# Patient Record
Sex: FEMALE | Race: WHITE | Employment: UNEMPLOYED | URBAN - METROPOLITAN AREA
[De-identification: names, ages, dates, MRNs, and addresses within clinical notes are randomized per-mention and may not be internally consistent; named-entity substitution may affect disease eponyms.]

---

## 2021-11-01 LAB — PAP SMEAR, EXTERNAL: NORMAL

## 2021-11-10 ENCOUNTER — OFFICE VISIT (OUTPATIENT)
Dept: FAMILY MEDICINE CLINIC | Age: 31
End: 2021-11-10
Payer: COMMERCIAL

## 2021-11-10 VITALS
BODY MASS INDEX: 44.79 KG/M2 | RESPIRATION RATE: 20 BRPM | DIASTOLIC BLOOD PRESSURE: 70 MMHG | HEART RATE: 92 BPM | HEIGHT: 65 IN | OXYGEN SATURATION: 98 % | TEMPERATURE: 98 F | WEIGHT: 268.8 LBS | SYSTOLIC BLOOD PRESSURE: 110 MMHG

## 2021-11-10 DIAGNOSIS — F41.9 ANXIETY: Primary | ICD-10-CM

## 2021-11-10 DIAGNOSIS — R07.9 CHEST PAIN, UNSPECIFIED TYPE: ICD-10-CM

## 2021-11-10 PROCEDURE — 99203 OFFICE O/P NEW LOW 30 MIN: CPT | Performed by: FAMILY MEDICINE

## 2021-11-10 PROCEDURE — 93000 ELECTROCARDIOGRAM COMPLETE: CPT | Performed by: FAMILY MEDICINE

## 2021-11-10 RX ORDER — CETIRIZINE HYDROCHLORIDE 10 MG/1
1 CAPSULE, LIQUID FILLED ORAL DAILY
COMMUNITY

## 2021-11-10 RX ORDER — MULTIVITAMIN
1 CAPSULE ORAL DAILY
COMMUNITY
End: 2022-08-29

## 2021-11-10 RX ORDER — HYDROXYZINE PAMOATE 25 MG/1
25 CAPSULE ORAL
Qty: 30 CAPSULE | Refills: 2 | Status: SHIPPED | OUTPATIENT
Start: 2021-11-10 | End: 2022-01-24

## 2021-11-10 RX ORDER — NORETHINDRONE ACETATE AND ETHINYL ESTRADIOL 1.5; 3 MG/1; UG/1
1 TABLET ORAL DAILY
COMMUNITY
Start: 2021-11-01 | End: 2022-08-29

## 2021-11-10 NOTE — PROGRESS NOTES
1. Have you been to the ER, urgent care clinic since your last visit? new patient  Hospitalized since your last visit? No    2. Have you seen or consulted any other health care providers outside of the [address] [place] [address] since your last visit? Include any pap smears or colon screening.  No

## 2021-11-10 NOTE — PROGRESS NOTES
Chief Complaint   Patient presents with   [facility] Establish Care     new patient    Anxiety     check up    Chest Pain     into left arm and upper back ? pinched nerve     Pt was seen to establish care. Pt reports that she has been on Lexapro for years, stopped due to weight gain. Pt has noted increased anxiety since then, is concerned about weight gain with any alternate medication. Pt was on Zoloft before Lexapro, made her sick. Father is on effexor, had some weight gain, but could be due to other medical issues. Pt is interested in theanine supplements, her friend takes with benefit for anxiety. Pt would like an as needed medication to start with, while she focuses on weight loss. Pt has also had some chest pain, occurs with anxiety, but also has tenderness when her  pushes on her back. NO SOB. Pain will come and go, will last about 10 minutes. No FH of heart disease. Symptoms occur primarily with rest when focusing on the symptoms. Pt is followed by her GYN for PCOS. Subjective: (As above and below)     Chief Complaint   Patient presents with   [facility] Establish Care     new patient    Anxiety     check up    Chest Pain     into left arm and upper back ? pinched nerve     she is a 35y.o. year old female who presents for evaluation. Reviewed PmHx, RxHx, FmHx, SocHx, AllgHx and updated in chart.     Review of Systems - negative except as listed above    Objective:     Vitals:    11/10/21 0947   BP: 110/70   Pulse: 92   Resp: 20   Temp: 98 °F (36.7 °C)   TempSrc: Core   SpO2: 98%   Weight: 268 lb 12.8 oz (121.9 kg)   Height: 5' 4.75\" (1.645 m)     Physical Examination: General appearance - alert, well appearing, and in no distress  Mental status - normal mood, behavior, speech, dress, motor activity, and thought processes  Ears - bilateral TM's and external ear canals normal  Chest - clear to auscultation, no wheezes, rales or rhonchi, symmetric air entry  Heart - normal rate, regular rhythm, normal S1, S2, no murmurs, rubs, clicks or gallops  Musculoskeletal - no joint tenderness, deformity or swelling  Extremities - peripheral pulses normal, no pedal edema, no clubbing or cyanosis    Assessment/ Plan:   1. Anxiety  -reviewed as needed use, advised that supplement was safe but unclear benefit  - hydrOXYzine pamoate (VISTARIL) 25 mg capsule; Take 1 Capsule by mouth daily as needed for Anxiety. Dispense: 30 Capsule; Refill: 2    2. Chest pain, unspecified type  -likely MSK related, will check EKG  - AMB POC EKG ROUTINE W/ 12 LEADS, INTER & REP       I have discussed the diagnosis with the patient and the intended plan as seen in the above orders. The patient has received an after-visit summary and questions were answered concerning future plans.      Medication Side Effects and Warnings were discussed with patient: yes  Patient Labs were reviewed: yes  Patient Past Records were reviewed:  yes    Joseph West M.D.

## 2021-12-09 ENCOUNTER — TELEPHONE (OUTPATIENT)
Dept: FAMILY MEDICINE CLINIC | Age: 31
End: 2021-12-09

## 2021-12-09 RX ORDER — ONDANSETRON 4 MG/1
4 TABLET, ORALLY DISINTEGRATING ORAL
Qty: 20 TABLET | Refills: 2 | Status: SHIPPED | OUTPATIENT
Start: 2021-12-09 | End: 2022-01-24 | Stop reason: SDUPTHER

## 2021-12-09 NOTE — TELEPHONE ENCOUNTER
----- Message from Phu Son sent at 12/7/2021  2:00 PM EST -----  Regarding: Prescription Request   Bharath Gilbert been having spouts of nausea I just left the obgyn to get ultrasound thinking it was from my pcos but everything is normal. Im wondering if you can prescribe me zofran to take as needed I have been prescribed   it in the past its the only thing that helps with the nausea especially with my period.   Thank you

## 2022-01-07 RX ORDER — BUPROPION HYDROCHLORIDE 150 MG/1
150 TABLET ORAL DAILY
Qty: 30 TABLET | Refills: 0 | Status: SHIPPED | OUTPATIENT
Start: 2022-01-07 | End: 2022-01-24

## 2022-01-21 ENCOUNTER — VIRTUAL VISIT (OUTPATIENT)
Dept: FAMILY MEDICINE CLINIC | Age: 32
End: 2022-01-21
Payer: COMMERCIAL

## 2022-01-21 DIAGNOSIS — J01.90 ACUTE NON-RECURRENT SINUSITIS, UNSPECIFIED LOCATION: Primary | ICD-10-CM

## 2022-01-21 PROCEDURE — 99212 OFFICE O/P EST SF 10 MIN: CPT

## 2022-01-21 RX ORDER — AMOXICILLIN 875 MG/1
875 TABLET, FILM COATED ORAL 2 TIMES DAILY
Qty: 20 TABLET | Refills: 0 | Status: SHIPPED | OUTPATIENT
Start: 2022-01-21 | End: 2022-01-31

## 2022-01-21 NOTE — PROGRESS NOTES
Earl Zaldivar is a 28 y.o. female presenting for/with:    Chief Complaint   Patient presents with    Nasal Congestion     OTC meds, yellowish-green drainage.  Ear Fullness     Bilateral ears, warm compress, OTC swimmers ear gtts       There were no vitals taken for this visit. Pain Scale: /10  Pain Location:     1. Have you been to the ER, urgent care clinic since your last visit? Hospitalized since your last visit? NO    2. Have you seen or consulted any other health care providers outside of the [address] [place] [address] since your last visit? Include any pap smears or colon screening. NO    Symptom review:  NO  Fever   NO  Shaking chills  NO  Cough  NO  Body aches  NO  Coughing up blood  NO  Chest congestion  NO  Chest pain  NO  Shortness of breath  NO  Profound Loss of smell/taste  NO  Nausea/Vomiting   NO  Loose stool/Diarrhea  NO  any skin issues    Patient Risk Factors Reviewed as follows:  NO  have you been in Close contact with confirmed COVID19 patient   NO  History of recent travel to affected geographical areas within the past 14 days  NO  COPD  NO  Active Cancer/Leukemia/Lymphoma/Chemotherapy  NO  Oral steroid use  NO  Pregnant  NO  Diabetes Mellitus  NO  Heart disease  NO  Asthma  NO Health care worker at home  [address] care worker  NO Is there a Pregnant Woman in the home  NO Dialysis pt in the home   NO a large number of people living in the home    Learning Assessment 11/10/2021   PRIMARY LEARNER Patient   HIGHEST LEVEL OF EDUCATION - PRIMARY LEARNER  GRADUATED HIGH SCHOOL OR GED   BARRIERS PRIMARY LEARNER NONE   CO-LEARNER CAREGIVER No   PRIMARY LANGUAGE ENGLISH   LEARNER PREFERENCE PRIMARY DEMONSTRATION   ANSWERED BY Patient   RELATIONSHIP SELF     Fall Risk Assessment, last 12 mths 1/21/2022   Able to walk? Yes   Fall in past 12 months? 0   Do you feel unsteady?  0   Are you worried about falling 0       3 most recent PHQ Screens 1/21/2022   Little interest or pleasure in doing things Not at all   Feeling down, depressed, irritable, or hopeless Not at all   Total Score PHQ 2 0     Abuse Screening Questionnaire 1/21/2022   Do you ever feel afraid of your partner? N   Are you in a relationship with someone who physically or mentally threatens you? N   Is it safe for you to go home? Y       ADL Assessment 1/21/2022   Feeding yourself No Help Needed   Getting from bed to chair No Help Needed   Getting dressed No Help Needed   Bathing or showering No Help Needed   Walk across the room (includes cane/walker) No Help Needed   Using the telphone No Help Needed   Taking your medications No Help Needed   Preparing meals No Help Needed   Managing money (expenses/bills) No Help Needed   Moderately strenuous housework (laundry) No Help Needed   Shopping for personal items (toiletries/medicines) No Help Needed   Shopping for groceries No Help Needed   Driving No Help Needed   Climbing a flight of stairs No Help Needed   Getting to places beyond walking distances No Help Needed      No advanced directives on file. Verified emergency contact.

## 2022-01-21 NOTE — PATIENT INSTRUCTIONS
Sinusitis: Care Instructions  Your Care Instructions     Sinusitis is an infection of the lining of the sinus cavities in your head. Sinusitis often follows a cold. It causes pain and pressure in your head and face. In most cases, sinusitis gets better on its own in 1 to 2 weeks. But some mild symptoms may last for several weeks. Sometimes antibiotics are needed. Follow-up care is a key part of your treatment and safety. Be sure to make and go to all appointments, and call your doctor if you are having problems. It's also a good idea to know your test results and keep a list of the medicines you take. How can you care for yourself at home? · Take an over-the-counter pain medicine, such as acetaminophen (Tylenol), ibuprofen (Advil, Motrin), or naproxen (Aleve). Read and follow all instructions on the label. · If the doctor prescribed antibiotics, take them as directed. Do not stop taking them just because you feel better. You need to take the full course of antibiotics. · Be careful when taking over-the-counter cold or flu medicines and Tylenol at the same time. Many of these medicines have acetaminophen, which is Tylenol. Read the labels to make sure that you are not taking more than the recommended dose. Too much acetaminophen (Tylenol) can be harmful. · Breathe warm, moist air from a steamy shower, a hot bath, or a sink filled with hot water. Avoid cold, dry air. Using a humidifier in your home may help. Follow the directions for cleaning the machine. · Use saline (saltwater) nasal washes. This can help keep your nasal passages open and wash out mucus and bacteria. You can buy saline nose drops at a grocery store or drugstore. Or you can make your own at home by adding 1 teaspoon of salt and 1 teaspoon of baking soda to 2 cups of distilled water. If you make your own, fill a bulb syringe with the solution, insert the tip into your nostril, and squeeze gently. Flonnie Carrier your nose.   · Put a hot, wet towel or a warm gel pack on your face 3 or 4 times a day for 5 to 10 minutes each time. · Try a decongestant nasal spray like oxymetazoline (Afrin). Do not use it for more than 3 days in a row. Using it for more than 3 days can make your congestion worse. When should you call for help? Call your doctor now or seek immediate medical care if:    · You have new or worse swelling or redness in your face or around your eyes.     · You have a new or higher fever. Watch closely for changes in your health, and be sure to contact your doctor if:    · You have new or worse facial pain.     · The mucus from your nose becomes thicker (like pus) or has new blood in it.     · You are not getting better as expected. Where can you learn more? Go to http://www.gray.com/  Enter V656 in the search box to learn more about \"Sinusitis: Care Instructions. \"  Current as of: December 2, 2020               Content Version: 13.0  © 2006-2021 [employer]. Care instructions adapted under license by [employer] (which disclaims liability or warranty for this information). If you have questions about a medical condition or this instruction, always ask your healthcare professional. [place] [address] any warranty or liability for your use of this information.

## 2022-01-21 NOTE — PROGRESS NOTES
Geoffrey Barnes is a 28 y.o. female evaluated via telephone on 1/21/2022. Consent:    She and/or health care decision maker is aware that that she may receive a bill for this telephone service, depending on her insurance coverage, and has provided verbal consent to proceed: Yes      Documentation:  I communicated with the patient and/or health care decision maker about nasal congestion which began about 3 weeks ago. The symptoms initially improved, but started getting worse about 1 week ago, now with 2-3 days of ear fullness. Denies fever, cough, SOB, sore throat, and sick contacts; she has no COVID concerns. Details of this discussion including any medical advice provided: Will treat with ABX given chronicity and history of illness. She should notify us if her symptoms continue after completing ABX. I affirm this is a Patient Initiated Episode with an Established Patient who has not had a related appointment within my department in the past 7 days or scheduled within the next 24 hours. ASSESSMENT AND PLAN:       ICD-10-CM ICD-9-CM    1. Acute non-recurrent sinusitis, unspecified location  J01.90 461.9 amoxicillin (AMOXIL) 875 mg tablet       Patient aware of plan of care and verbalized understanding. Questions answered. RTC PRN or sooner if needed. Carloz Sharif NP    Total Time: minutes: 5-10 minutes    Note: not billable if this call serves to triage the patient into an appointment for the relevant concern    Geoffrey Barnes, was evaluated through a synchronous (real-time) audio-video encounter. The patient (or guardian if applicable) is aware that this is a billable service, which includes applicable co-pays. This Virtual Visit was conducted with patient's (and/or legal guardian's) consent.  The visit was conducted pursuant to the emergency declaration under the [facility] waiver authority and the [employer] Supplemental Appropriations Act. Patient identification was verified, and a caregiver was present when appropriate. The patient was located in a state where the provider was licensed to provide care.       Elijah Gallo NP

## 2022-01-24 ENCOUNTER — OFFICE VISIT (OUTPATIENT)
Dept: FAMILY MEDICINE CLINIC | Age: 32
End: 2022-01-24
Payer: COMMERCIAL

## 2022-01-24 ENCOUNTER — HOSPITAL ENCOUNTER (OUTPATIENT)
Dept: GENERAL RADIOLOGY | Age: 32
Discharge: HOME | End: 2022-01-24
Payer: COMMERCIAL

## 2022-01-24 VITALS
HEART RATE: 92 BPM | HEIGHT: 65 IN | RESPIRATION RATE: 18 BRPM | TEMPERATURE: 98.1 F | WEIGHT: 270 LBS | SYSTOLIC BLOOD PRESSURE: 140 MMHG | DIASTOLIC BLOOD PRESSURE: 90 MMHG | BODY MASS INDEX: 44.98 KG/M2 | OXYGEN SATURATION: 100 %

## 2022-01-24 DIAGNOSIS — G89.29 CHRONIC LEFT SHOULDER PAIN: ICD-10-CM

## 2022-01-24 DIAGNOSIS — F41.9 ANXIETY: ICD-10-CM

## 2022-01-24 DIAGNOSIS — M89.8X1 PAIN OF LEFT SCAPULA: ICD-10-CM

## 2022-01-24 DIAGNOSIS — G89.29 CHRONIC LEFT SHOULDER PAIN: Primary | ICD-10-CM

## 2022-01-24 DIAGNOSIS — M25.512 CHRONIC LEFT SHOULDER PAIN: ICD-10-CM

## 2022-01-24 DIAGNOSIS — M25.512 CHRONIC LEFT SHOULDER PAIN: Primary | ICD-10-CM

## 2022-01-24 PROCEDURE — 73030 X-RAY EXAM OF SHOULDER: CPT

## 2022-01-24 PROCEDURE — 73010 X-RAY EXAM OF SHOULDER BLADE: CPT

## 2022-01-24 PROCEDURE — 99214 OFFICE O/P EST MOD 30 MIN: CPT | Performed by: NURSE PRACTITIONER

## 2022-01-24 RX ORDER — ESCITALOPRAM OXALATE 10 MG/1
10 TABLET ORAL DAILY
Qty: 90 TABLET | Refills: 0 | Status: SHIPPED | OUTPATIENT
Start: 2022-01-24 | End: 2022-02-08 | Stop reason: DRUGHIGH

## 2022-01-24 RX ORDER — ONDANSETRON 4 MG/1
4 TABLET, ORALLY DISINTEGRATING ORAL
Qty: 20 TABLET | Refills: 2 | Status: SHIPPED | OUTPATIENT
Start: 2022-01-24 | End: 2022-06-30 | Stop reason: SDUPTHER

## 2022-01-24 NOTE — PROGRESS NOTES
1. Have you been to the ER, urgent care clinic since your last visit? Hospitalized since your last visit? No    2. Have you seen or consulted any other health care providers outside of the [address] [place] [address] since your last visit? Include any pap smears or colon screening.  No      Chief Complaint   Patient presents with    Shoulder Pain     left radiating to chest         Visit Vitals  BP (!) 140/90 (BP 1 Location: Left upper arm, BP Patient Position: Sitting, BP Cuff Size: Large adult)   Pulse 92   Temp 98.1 °F (36.7 °C) (Temporal)   Resp 18   Ht 5' 4.5\" (1.638 m)   Wt 270 lb (122.5 kg)   LMP 11/01/2021 (Approximate)   SpO2 100%   BMI 45.63 kg/m²       Pain Scale: 5/10  Pain Location: Shoulder (left)

## 2022-01-24 NOTE — PROGRESS NOTES
Subjective:      Eleuterio Rubio is a 28 y.o. right handed female seen for evaluation and treatment left  Shoulder and shoulder blade  pain. This is evaluated as a personal injury. Ms. Vale Sharma works for [employer]. The pain is described as tenderness. The onset of the pain was sudden 1 to 2 months ago. The pain occurs intermittently more prominent when things calm down in the evening and lasts 30 minutes. Location is global, radiates to the shoulder blade and across the left side of the chest . Cardiac workup negative. She endorses  That her anxiety plays a role. She denies a history  of dislocation. Symptoms are aggravated by no limitations. Symptoms are diminished by  rest, heat, massage. Limited activities include: no limitations. No stiffness, no weakness, no swelling, no crepitus noted. Occasional popping sensation when reaching forward. She endorses discomfort when trying to reach behind towards her back. Patient is a [address]  and she has not missed work. Related history: negative for prior surgery, trauma, arthritis or disorders. Outside reports reviewed: none. Anxiety; Hx of anxiety with  panic attacks . She endorses having good results with Lexapro . She had negative side effects of weight gain in the past and stopped the medication. She was prescribed Wellbutrin which was ineffective . She would like to go back on Lexapro. Patient Active Problem List   Diagnosis Code    Gastroesophageal reflux disease without esophagitis K21.9    Anxiety F41.9    History of seasonal allergies Z88.9       Current Outpatient Medications   Medication Sig Dispense Refill    busPIRone (BUSPAR) 5 mg tablet Take 1 Tablet by mouth two (2) times daily as needed (panic attack). 30 Tablet 1    escitalopram oxalate (LEXAPRO) 10 mg tablet Take 1 Tablet by mouth daily.  90 Tablet 0    ondansetron (ZOFRAN ODT) 4 mg disintegrating tablet Take 1 Tablet by mouth every eight (8) hours as needed for Nausea or Vomiting. 20 Tablet 2    amoxicillin (AMOXIL) 875 mg tablet Take 1 Tablet by mouth two (2) times a day for 10 days. 20 Tablet 0    Junel 1.5/30, 21, 1.5-30 mg-mcg tab 1 Tablet daily.  Cetirizine (ZyrTEC) 10 mg cap 1 Tablet daily.  cholecalciferol, vitamin D3, (VITAMIN D3 PO) daily.  OMEPRAZOLE PO 1 Tablet daily.  multivitamin capsule 1 Tablet daily.  Gummies       Allergies   Allergen Reactions    Milk Nausea Only    Tree Nuts Other (comments)     Per allergy testing     Past Medical History:   Diagnosis Date    Anxiety     GERD (gastroesophageal reflux disease)     History of seasonal allergies      Past Surgical History:   Procedure Laterality Date    HX WISDOM TEETH EXTRACTION       Family History   Problem Relation Age of Onset    Gall Bladder Disease Mother     Anxiety Father     Depression Father      Social History     Tobacco Use    Smoking status: Never Smoker    Smokeless tobacco: Never Used   Substance Use Topics    Alcohol use: Yes     Comment: Social      Visit Vitals  BP (!) 140/90 (BP 1 Location: Left upper arm, BP Patient Position: Sitting, BP Cuff Size: Large adult)   Pulse 92   Temp 98.1 °F (36.7 °C) (Temporal)   Resp 18   Ht 5' 4.5\" (1.638 m)   Wt 270 lb (122.5 kg)   SpO2 100%   BMI 45.63 kg/m²   Pain 5/10 left shoulder   Objective:     General:  alert, cooperative, no distress, appears stated age   Left Shoulder  Bruising:   absent   Crepitus:  absent   Joint Tenderness:  scapula   Effusion:   none present   Biceps Tendon Rupture:   absent   Winging Scapula:   negative   Adson's:  negative   Active ROM:   pertinent positives forward extension, lateral extension reminder WNL   Neer:   negative   Beltran  negative   Stability:   normal   Apprehension Sign:   negative/negative   Atrophy:   none noted   Strength:  biceps 5/5, triceps 5/5, abduction 5/5, adduction 5/5     Imaging  X-Ray:ordered   Assessment:          ICD-10-CM ICD-9-CM 1. Chronic left shoulder pain  M25.512 719.41 XR SHOULDER LT AP/LAT MIN 2 V    G89.29 338.29    2. Anxiety  F41.9 300.00    3. Pain of left scapula  M89.8X1 733.90 XR SHOULDER LT AP/LAT MIN 2 V      CANCELED: XR SCAPULA RT       Plan:     Orders Placed This Encounter    XR SHOULDER LT AP/LAT MIN 2 V     4 views please  Shoulder  x ray  Grashey view/ true AP view  True lateral-scapula view Y view     Standing Status:   Future     Number of Occurrences:   1     Standing Expiration Date:   4/24/2022     Scheduling Instructions:      [facility]     Order Specific Question:   Is Patient Pregnant? Answer:   Unknown    escitalopram oxalate (LEXAPRO) 10 mg tablet     Sig: Take 1 Tablet by mouth daily. Dispense:  90 Tablet     Refill:  0    ondansetron (ZOFRAN ODT) 4 mg disintegrating tablet     Sig: Take 1 Tablet by mouth every eight (8) hours as needed for Nausea or Vomiting. Dispense:  20 Tablet     Refill:  2    busPIRone (BUSPAR) 5 mg tablet     Sig: Take 1 Tablet by mouth two (2) times daily as needed (panic attack).      Dispense:  30 Tablet     Refill:  [address] NP-C

## 2022-01-25 PROBLEM — F41.9 ANXIETY: Status: ACTIVE | Noted: 2022-01-25

## 2022-01-25 PROBLEM — Z88.9 HISTORY OF SEASONAL ALLERGIES: Status: ACTIVE | Noted: 2022-01-25

## 2022-01-25 PROBLEM — K21.9 GASTROESOPHAGEAL REFLUX DISEASE WITHOUT ESOPHAGITIS: Status: ACTIVE | Noted: 2022-01-25

## 2022-01-25 RX ORDER — BUSPIRONE HYDROCHLORIDE 5 MG/1
5 TABLET ORAL
Qty: 30 TABLET | Refills: 1 | Status: SHIPPED | OUTPATIENT
Start: 2022-01-25 | End: 2022-01-25

## 2022-01-25 NOTE — ACP (ADVANCE CARE PLANNING)
Discussed importance of advanced medical directives with patient. Patient is capable of making decisions.   Gabriele Westfall NP-C

## 2022-02-02 ENCOUNTER — HOSPITAL ENCOUNTER (EMERGENCY)
Age: 32
Discharge: HOME | End: 2022-02-02
Attending: EMERGENCY MEDICINE
Payer: COMMERCIAL

## 2022-02-02 VITALS
DIASTOLIC BLOOD PRESSURE: 85 MMHG | TEMPERATURE: 98.3 F | HEIGHT: 64 IN | OXYGEN SATURATION: 97 % | RESPIRATION RATE: 16 BRPM | BODY MASS INDEX: 43.02 KG/M2 | HEART RATE: 8 BPM | WEIGHT: 252 LBS | SYSTOLIC BLOOD PRESSURE: 141 MMHG

## 2022-02-02 DIAGNOSIS — L52 ERYTHEMA NODOSUM: Primary | ICD-10-CM

## 2022-02-02 PROCEDURE — 99283 EMERGENCY DEPT VISIT LOW MDM: CPT

## 2022-02-02 RX ORDER — HYDROXYZINE 50 MG/1
25 TABLET, FILM COATED ORAL
COMMUNITY
Start: 2022-01-27 | End: 2022-02-08

## 2022-02-02 RX ORDER — CEPHALEXIN 500 MG/1
500 CAPSULE ORAL 3 TIMES DAILY
Qty: 21 CAPSULE | Refills: 0 | Status: SHIPPED | OUTPATIENT
Start: 2022-02-02 | End: 2022-02-09

## 2022-02-02 RX ORDER — NAPROXEN 500 MG/1
500 TABLET ORAL
Qty: 20 TABLET | Refills: 0 | Status: SHIPPED | OUTPATIENT
Start: 2022-02-02 | End: 2022-04-26

## 2022-02-03 ENCOUNTER — HOSPITAL ENCOUNTER (OUTPATIENT)
Dept: ULTRASOUND IMAGING | Age: 32
Discharge: HOME | End: 2022-02-03
Attending: EMERGENCY MEDICINE
Payer: COMMERCIAL

## 2022-02-03 DIAGNOSIS — M79.662 PAIN IN LEFT LOWER LEG: ICD-10-CM

## 2022-02-03 PROCEDURE — 93971 EXTREMITY STUDY: CPT

## 2022-02-03 NOTE — ED NOTES
I have reviewed discharge instructions with the patient. The patient verbalized understanding.         Aware to come in as scheduled for LT leg doppler-scheduling to call in AM and to bring order slip with her

## 2022-02-03 NOTE — ED PROVIDER NOTES
EMERGENCY DEPARTMENT HISTORY AND PHYSICAL EXAM      Date: 2/2/2022  Patient Name: Sharla Cormier    History of Presenting Illness     Chief Complaint   Patient presents with    Leg Pain       History Provided By: Patient    HPI:   The history is provided by the patient. Leg Pain   This is a new problem. The current episode started more than 2 days ago. The problem occurs constantly. The problem has not changed since onset. The pain is present in the left lower leg. The quality of the pain is described as aching. The pain is moderate. Pertinent negatives include no numbness, full range of motion, no back pain and no neck pain. She has tried nothing for the symptoms. The treatment provided no relief. There has been no history of extremity trauma. Sharla Cormier, 28 y.o. female  presents to the ED with cc of painful red area on the left lower leg that she reports started 4 to 5 days ago. Patient reports she just did a course of penicillin for an ear infection. She just finished 2 days ago. She denies any fevers or chills. Denies any trauma injuries or falls, denies any punctures or abrasions to the leg. She is not had this rash before. She is taken no medications. She was concerned about a DVT and was also concerned about infection. She does take birth control but takes no blood thinners. Patient reports a history of anxiety. There are no other complaints, changes, or physical findings at this time. PCP: Cr Love NP    No current facility-administered medications on file prior to encounter. Current Outpatient Medications on File Prior to Encounter   Medication Sig Dispense Refill    hydrOXYzine HCL (ATARAX) 50 mg tablet Take 25 mg by mouth three (3) times daily as needed.  escitalopram oxalate (LEXAPRO) 10 mg tablet Take 1 Tablet by mouth daily.  90 Tablet 0    ondansetron (ZOFRAN ODT) 4 mg disintegrating tablet Take 1 Tablet by mouth every eight (8) hours as needed for Nausea or Vomiting. 20 Tablet 2    Junel 1.5/30, 21, 1.5-30 mg-mcg tab 1 Tablet daily.  Cetirizine (ZyrTEC) 10 mg cap 1 Tablet daily.  cholecalciferol, vitamin D3, (VITAMIN D3 PO) daily.  OMEPRAZOLE PO 1 Tablet daily.  multivitamin capsule 1 Tablet daily. Gummies         Past History     Past Medical History:  Past Medical History:   Diagnosis Date    Anxiety     GERD (gastroesophageal reflux disease)     History of seasonal allergies        Past Surgical History:  Past Surgical History:   Procedure Laterality Date    HX WISDOM TEETH EXTRACTION         Family History:  Family History   Problem Relation Age of Onset    Gall Bladder Disease Mother     Anxiety Father     Depression Father        Social History:  Social History     Tobacco Use    Smoking status: Never Smoker    Smokeless tobacco: Never Used   Vaping Use    Vaping Use: Never used   Substance Use Topics    Alcohol use: Yes     Comment: Social    Drug use: Never       Allergies: Allergies   Allergen Reactions    Milk Nausea Only    Tree Nuts Other (comments)     Per allergy testing         Review of Systems   Review of Systems   Constitutional: Negative. Negative for chills and fever. HENT: Negative. Negative for congestion, rhinorrhea and sore throat. Eyes: Negative. Negative for discharge and redness. Respiratory: Negative. Negative for cough and shortness of breath. Cardiovascular: Negative. Negative for chest pain and palpitations. Gastrointestinal: Negative. Negative for abdominal pain, nausea and vomiting. Musculoskeletal: Negative. Negative for arthralgias, back pain and neck pain. Skin: Positive for rash. Negative for wound. Allergic/Immunologic: Negative. Neurological: Positive for headaches. Negative for dizziness and numbness. Hematological: Negative. Negative for adenopathy. Does not bruise/bleed easily. Psychiatric/Behavioral: Negative. Negative for confusion.  The patient is not nervous/anxious. All other systems reviewed and are negative. Physical Exam   Physical Exam  Vitals and nursing note reviewed. Constitutional:       General: She is not in acute distress. Appearance: Normal appearance. She is well-developed. She is not ill-appearing, toxic-appearing or diaphoretic. Comments: Female sitting in triage chair in no acute distress   HENT:      Head: Normocephalic and atraumatic. Nose: Nose normal.      Mouth/Throat:      Mouth: Mucous membranes are moist.      Pharynx: Oropharynx is clear. Eyes:      Extraocular Movements: Extraocular movements intact. Conjunctiva/sclera: Conjunctivae normal.      Pupils: Pupils are equal, round, and reactive to light. Cardiovascular:      Rate and Rhythm: Normal rate and regular rhythm. Pulses: Normal pulses. Pulmonary:      Effort: Pulmonary effort is normal. No respiratory distress. Abdominal:      General: There is no distension. Palpations: Abdomen is soft. Musculoskeletal:         General: Tenderness present. No swelling. Normal range of motion. Cervical back: Normal range of motion and neck supple. No rigidity. No muscular tenderness. Right lower leg: No edema. Left lower leg: Tenderness present. No swelling. No edema. Legs:    Skin:     General: Skin is warm and dry. Capillary Refill: Capillary refill takes less than 2 seconds. Findings: No erythema or rash. Neurological:      General: No focal deficit present. Mental Status: She is alert and oriented to person, place, and time. Mental status is at baseline. Cranial Nerves: No cranial nerve deficit. Sensory: No sensory deficit. Psychiatric:         Mood and Affect: Mood normal.         Behavior: Behavior normal.         Thought Content:  Thought content normal.         Judgment: Judgment normal.         Diagnostic Study Results     Labs -   No results found for this or any previous visit (from the past 12 hour(s)). Radiologic Studies -   No orders to display     CT Results  (Last 48 hours)    None        CXR Results  (Last 48 hours)    None          Medical Decision Making   I am the first provider for this patient. I reviewed the vital signs, available nursing notes, past medical history, past surgical history, family history and social history. Vital Signs-Reviewed the patient's vital signs. Patient Vitals for the past 12 hrs:   Temp Pulse Resp BP SpO2   02/02/22 2008 98.3 °F (36.8 °C) 89 16 (!) 167/95 100 %             Records Reviewed: Nursing Notes and Old Medical Records    Provider Notes (Medical Decision Making):   Patient presents with tender nodules to the shin appears to be erythema nodosum, will give her anti-inflammatory to take, she is concerned about infection we will give her course of antibiotics and also check a duplex to rule out DVT. ED Course:   Initial assessment performed. The patients presenting problems have been discussed, and they are in agreement with the care plan formulated and outlined with them. I have encouraged them to ask questions as they arise throughout their visit. Medications Given in the ED:    Medications - No data to display        8:39 PM    Presents with red tender nodules to the shin, recent antibiotic use, most likely hypersensitivity reaction to the penicillin consistent with erythema nodosum, will recommend symptomatic treatment anti-inflammatory, will send for a duplex to rule out DVT as she is concerned about this and has a history of anxiety. We will also give her course of antibiotics to cover for possible infection. Pt has been re-examined and states that they are feeling better and have no new complaints. medications,  diagnosis, follow up plan and return instructions have been reviewed and discussed with the patient and/or family.  Pt and/or family were instructed on symptoms that may arise after discharge requiring re-evaluation by a physician. Pt and/or family have had the opportunity to ask questions about their care. Patient and/or family verbalized understanding and agreement with care plan, follow up and return instructions. Patient and/or family agree to return in 50 hours if their symptoms are not improving or immediately if they have any change in their condition. Abx were prescribed, pt advised that diarrhea and rash are possible side effects of the medications. I have also put together some discharge instructions for patient that include: 1) educational information regarding their diagnosis, 2) how to care for their diagnosis at home, as well a 3) list of reasons why they would want to return to the ED prior to their follow-up appointment, should their condition change. Vivi Segal MD      Procedures      Disposition:    Discharged      DISCHARGE PLAN:  1. Current Discharge Medication List      START taking these medications    Details   naproxen (NAPROSYN) 500 mg tablet Take 1 Tablet by mouth every twelve (12) hours as needed for Pain. Qty: 20 Tablet, Refills: 0  Start date: 2/2/2022      cephALEXin (Keflex) 500 mg capsule Take 1 Capsule by mouth three (3) times daily for 7 days. Qty: 21 Capsule, Refills: 0  Start date: 2/2/2022, End date: 2/9/2022           2. Follow-up Information     Follow up With Specialties Details Why Contact Adin Pena NP Nurse Practitioner Schedule an appointment as soon as possible for a visit in 2 days For follow up Hermelindo Jennings  [address]  [phone]          3. Return to ED if worse     Diagnosis     Clinical Impression:   1. Erythema nodosum        Attestations: Vivi Segal MD    Please note that this dictation was completed with [employer], the [employer] voice recognition software.   Quite often unanticipated grammatical, syntax, homophones, and other interpretive errors are inadvertently transcribed by the computer software. Please disregard these errors. Please excuse any errors that have escaped final proofreading. Thank you.

## 2022-02-08 ENCOUNTER — OFFICE VISIT (OUTPATIENT)
Dept: FAMILY MEDICINE CLINIC | Age: 32
End: 2022-02-08
Payer: COMMERCIAL

## 2022-02-08 VITALS
OXYGEN SATURATION: 99 % | DIASTOLIC BLOOD PRESSURE: 74 MMHG | BODY MASS INDEX: 45.75 KG/M2 | RESPIRATION RATE: 14 BRPM | HEIGHT: 64 IN | HEART RATE: 71 BPM | WEIGHT: 268 LBS | SYSTOLIC BLOOD PRESSURE: 120 MMHG | TEMPERATURE: 97.5 F

## 2022-02-08 DIAGNOSIS — L52 ERYTHEMA NODOSUM: ICD-10-CM

## 2022-02-08 DIAGNOSIS — Z13.29 SCREENING FOR THYROID DISORDER: ICD-10-CM

## 2022-02-08 DIAGNOSIS — Z11.59 ENCOUNTER FOR HEPATITIS C SCREENING TEST FOR LOW RISK PATIENT: ICD-10-CM

## 2022-02-08 DIAGNOSIS — F41.9 ANXIETY: Primary | ICD-10-CM

## 2022-02-08 PROCEDURE — 99214 OFFICE O/P EST MOD 30 MIN: CPT | Performed by: NURSE PRACTITIONER

## 2022-02-08 RX ORDER — ESCITALOPRAM OXALATE 20 MG/1
20 TABLET ORAL DAILY
Qty: 90 TABLET | Refills: 1 | Status: SHIPPED | OUTPATIENT
Start: 2022-02-08 | End: 2022-08-15

## 2022-02-08 NOTE — PROGRESS NOTES
Subjective:     Jose Alberto Mcintyre is a 28 y.o. female who presents today with the following:  Chief Complaint   Patient presents with    Shoulder Pain     left f/u    Anxiety     f/u       Patient Active Problem List   Diagnosis Code    Gastroesophageal reflux disease without esophagitis K21.9    Anxiety F41.9    History of seasonal allergies Z88.9     Anxiety-Patient endorses beginning to feel improvement in her symptoms since beginning the Lexapro. She does still continue to have some anxiety symptoms. She is requesting to increase Lexapro to 20 mg po daily. Chest pain/shoulder pain previously associated with anxiety has been resolved. Left leg concern: Patient endorses left leg redness for the past two weeks. She was previously seen in the [facility] ER who prescribed Keflex. DVT was ruled out in the ED with ultrasound. She endorses improvement since beginning the Keflex. She denies injury to the leg. COMPLIANT WITH MEDICATION:    Denies chest pain, dyspnea, palpitations, headache and blurred vision. Blood pressure normotensive. depression screening addressed-discussed medication change. abuse screening addressed denies    learning assessment addressed reviewed nurses notes    fall risk addressed not at risk    HM: addressed-hep c screen today.      ROS:  Gen: denies fever, chills, fatigue, weight loss, weight gain  HEENT:denies blurry vision, nasal congestion, sore throat  Resp: denies dypsnea, cough, wheezing  CV: denies chest pain radiating to the jaws or arms, palpitations, lower extremity edema  Abd: denies nausea, vomiting, diarrhea, constipation  Neuro: denies numbness/tingling  Endo: denies polyuria, polydipsia, heat/cold intolerance  Heme: no lymphadenopathy  Extremities: +left leg redness, rash    Allergies   Allergen Reactions    Milk Nausea Only    Tree Nuts Other (comments)     Per allergy testing         Current Outpatient Medications:     escitalopram oxalate (LEXAPRO) 20 mg tablet, Take 1 Tablet by mouth daily. , Disp: 90 Tablet, Rfl: 1    cephALEXin (Keflex) 500 mg capsule, Take 1 Capsule by mouth three (3) times daily for 7 days. , Disp: 21 Capsule, Rfl: 0    ondansetron (ZOFRAN ODT) 4 mg disintegrating tablet, Take 1 Tablet by mouth every eight (8) hours as needed for Nausea or Vomiting., Disp: 20 Tablet, Rfl: 2    Junel 1.5/30, 21, 1.5-30 mg-mcg tab, 1 Tablet daily. , Disp: , Rfl:     Cetirizine (ZyrTEC) 10 mg cap, 1 Tablet daily. , Disp: , Rfl:     cholecalciferol, vitamin D3, (VITAMIN D3 PO), daily. , Disp: , Rfl:     OMEPRAZOLE PO, 1 Tablet daily. , Disp: , Rfl:     multivitamin capsule, 1 Tablet daily. Gummies, Disp: , Rfl:     naproxen (NAPROSYN) 500 mg tablet, Take 1 Tablet by mouth every twelve (12) hours as needed for Pain. (Patient not taking: Reported on 2/8/2022), Disp: 20 Tablet, Rfl: 0    Past Medical History:   Diagnosis Date    Anxiety     GERD (gastroesophageal reflux disease)     History of seasonal allergies        Past Surgical History:   Procedure Laterality Date    HX WISDOM TEETH EXTRACTION         Social History     Tobacco Use   Smoking Status Never Smoker   Smokeless Tobacco Never Used       Social History     Socioeconomic History    Marital status:    Tobacco Use    Smoking status: Never Smoker    Smokeless tobacco: Never Used   Vaping Use    Vaping Use: Never used   Substance and Sexual Activity    Alcohol use: Yes     Comment: Social    Drug use: Never    Sexual activity: Never       Family History   Problem Relation Age of Onset    Gall Bladder Disease Mother     Anxiety Father     Depression Father          Objective:     Visit Vitals  /74 (BP 1 Location: Left upper arm, BP Patient Position: Sitting, BP Cuff Size: Large adult)   Pulse 71   Temp 97.5 °F (36.4 °C) (Temporal)   Resp 14   Ht 5' 4\" (1.626 m)   Wt 268 lb (121.6 kg)   LMP 11/01/2021 (Approximate)   SpO2 99%   BMI 46.00 kg/m²     Body mass index is 46 kg/m².   General: Alert and oriented. No acute distress. Well nourished  HEENT :  Ears:TMs are normal. Canals are clear. Eyes: pupils equal, round, react to light and accommodation. Extra ocular movements intact. Nose: patent. Mouth and throat is clear. MASK  Neck:supple full range of motion no thyromegaly. Trachea midline, No carotid bruits. No significant lymphadenopathy  Lungs[de-identified] clear to auscultation without wheezes, rales, or rhonchi. Heart :RRR, S1 & S2 are normal intensity. No murmur; no gallop  Abdomen: bowel sounds active. No tenderness, guarding, rebound, masses, hepatic or spleen enlargement  Back: no CVA tenderness. Extremities: without clubbing, cyanosis, or edema. +left leg erythema noted with palpable nodules  Pulses: radial and femoral pulses are normal  Neuro: HMF intact. Cranial nerves II through XII grossly normal.  Motor: is 5 over 5 and symmetrical.   Deep tendon reflexes: +2 equal  Psych:appropriate behavior, mood, affect and judgement. Results for orders placed or performed in visit on 11/10/21    PAP SMEAR   Result Value Ref Range    Pap Smear, External normal        No results found for this visit on 02/08/22. Assessment/ Plan:     1. Encounter for hepatitis C screening test for low risk patient   HEPATITIS C AB; Future    2. BMI 45.0-49.9, adult [facility])  Education given on heart healthy diet  CBC WITH AUTOMATED DIFF; Future   LIPID PANEL; Future   HEMOGLOBIN A1C WITH EAG; Future   COLLECTION VENOUS BLOOD,VENIPUNCTURE; Future   METABOLIC PANEL, COMPREHENSIVE; Future       3. Anxiety  Increase Lexapro 20 mg po daily. Education given on monitoring moods, if experience adverse effects when changing dose, call the office immediately    4. Screening for thyroid disorder  TSH 3RD GENERATION; Future      5. Erythema nodosum  Stable-Continue Keflex until regimen complete. Patient to message this office on Thursday to notify if her symptoms persist after completing the antibiotic.        Orders Placed This Encounter    COLLECTION VENOUS BLOOD,VENIPUNCTURE     Standing Status:   Future     Number of Occurrences:   1     Standing Expiration Date:   3/8/2022    HEPATITIS C AB     Standing Status:   Future     Number of Occurrences:   1     Standing Expiration Date:   3/8/2022    CBC WITH AUTOMATED DIFF     Standing Status:   Future     Number of Occurrences:   1     Standing Expiration Date:   3/8/2022    LIPID PANEL     Standing Status:   Future     Number of Occurrences:   1     Standing Expiration Date:   3/8/2022    HEMOGLOBIN A1C WITH EAG     Standing Status:   Future     Number of Occurrences:   1     Standing Expiration Date:   7/3/3108    METABOLIC PANEL, COMPREHENSIVE     Standing Status:   Future     Number of Occurrences:   1     Standing Expiration Date:   3/8/2022    TSH 3RD GENERATION     Standing Status:   Future     Number of Occurrences:   1     Standing Expiration Date:   2/8/2023    escitalopram oxalate (LEXAPRO) 20 mg tablet     Sig: Take 1 Tablet by mouth daily. Dispense:  90 Tablet     Refill:  1       Patient will follow up in one month or sooner if needed. Verbal and written instructions (see AVS) provided. Patient expresses understanding of diagnosis and treatment plan. Health Maintenance Due   Topic Date Due    Hepatitis C Screening  Never done    DTaP/Tdap/Td series (1 - Tdap) Never done     Follow up in 1 month.           Sky Finley, RICH-C

## 2022-02-08 NOTE — PROGRESS NOTES
Subjective:     Jacob Gaston is a 28 y.o. female who presents today with the following:  Chief Complaint   Patient presents with    Shoulder Pain     left f/u    Anxiety     f/u       Patient Active Problem List   Diagnosis Code    Gastroesophageal reflux disease without esophagitis K21.9    Anxiety F41.9    History of seasonal allergies Z88.9     Anxiety-Patient endorses beginning to feel improvement in her symptoms since beginning the Lexapro. She does still continue to have some anxiety symptoms. She is requesting to increase Lexapro to 20 mg po daily. Chest pain/shoulder pain previously associated with anxiety has been resolved. Left leg concern: Patient endorses left leg redness for the past two weeks. She was previously seen in the [facility] ER who prescribed Keflex. DVT was ruled out in the ED with ultrasound. She endorses improvement since beginning the Keflex. She denies injury to the leg. COMPLIANT WITH MEDICATION:    Denies chest pain, dyspnea, palpitations, headache and blurred vision. Blood pressure normotensive. depression screening addressed-discussed medication change. abuse screening addressed denies    learning assessment addressed reviewed nurses notes    fall risk addressed not at risk    HM: addressed-hep c screen today.      ROS:  Gen: denies fever, chills, fatigue, weight loss, weight gain  HEENT:denies blurry vision, nasal congestion, sore throat  Resp: denies dypsnea, cough, wheezing  CV: denies chest pain radiating to the jaws or arms, palpitations, lower extremity edema  Abd: denies nausea, vomiting, diarrhea, constipation  Neuro: denies numbness/tingling  Endo: denies polyuria, polydipsia, heat/cold intolerance  Heme: no lymphadenopathy  Extremities: +left leg redness, rash    Allergies   Allergen Reactions    Milk Nausea Only    Tree Nuts Other (comments)     Per allergy testing         Current Outpatient Medications:     escitalopram oxalate (LEXAPRO) 20 mg tablet, Take 1 Tablet by mouth daily. , Disp: 90 Tablet, Rfl: 1    cephALEXin (Keflex) 500 mg capsule, Take 1 Capsule by mouth three (3) times daily for 7 days. , Disp: 21 Capsule, Rfl: 0    ondansetron (ZOFRAN ODT) 4 mg disintegrating tablet, Take 1 Tablet by mouth every eight (8) hours as needed for Nausea or Vomiting., Disp: 20 Tablet, Rfl: 2    Junel 1.5/30, 21, 1.5-30 mg-mcg tab, 1 Tablet daily. , Disp: , Rfl:     Cetirizine (ZyrTEC) 10 mg cap, 1 Tablet daily. , Disp: , Rfl:     cholecalciferol, vitamin D3, (VITAMIN D3 PO), daily. , Disp: , Rfl:     OMEPRAZOLE PO, 1 Tablet daily. , Disp: , Rfl:     multivitamin capsule, 1 Tablet daily. Gummies, Disp: , Rfl:     naproxen (NAPROSYN) 500 mg tablet, Take 1 Tablet by mouth every twelve (12) hours as needed for Pain. (Patient not taking: Reported on 2/8/2022), Disp: 20 Tablet, Rfl: 0    Past Medical History:   Diagnosis Date    Anxiety     GERD (gastroesophageal reflux disease)     History of seasonal allergies        Past Surgical History:   Procedure Laterality Date    HX WISDOM TEETH EXTRACTION         Social History     Tobacco Use   Smoking Status Never Smoker   Smokeless Tobacco Never Used       Social History     Socioeconomic History    Marital status:    Tobacco Use    Smoking status: Never Smoker    Smokeless tobacco: Never Used   Vaping Use    Vaping Use: Never used   Substance and Sexual Activity    Alcohol use: Yes     Comment: Social    Drug use: Never    Sexual activity: Never       Family History   Problem Relation Age of Onset    Gall Bladder Disease Mother     Anxiety Father     Depression Father          Objective:     Visit Vitals  /74 (BP 1 Location: Left upper arm, BP Patient Position: Sitting, BP Cuff Size: Large adult)   Pulse 71   Temp 97.5 °F (36.4 °C) (Temporal)   Resp 14   Ht 5' 4\" (1.626 m)   Wt 268 lb (121.6 kg)   LMP 11/01/2021 (Approximate)   SpO2 99%   BMI 46.00 kg/m²     Body mass index is 46 kg/m².   General: Alert and oriented. No acute distress. Well nourished  HEENT :  Ears:TMs are normal. Canals are clear. Eyes: pupils equal, round, react to light and accommodation. Extra ocular movements intact. Nose: patent. Mouth and throat is clear. MASK  Neck:supple full range of motion no thyromegaly. Trachea midline, No carotid bruits. No significant lymphadenopathy  Lungs[de-identified] clear to auscultation without wheezes, rales, or rhonchi. Heart :RRR, S1 & S2 are normal intensity. No murmur; no gallop  Abdomen: bowel sounds active. No tenderness, guarding, rebound, masses, hepatic or spleen enlargement  Back: no CVA tenderness. Extremities: without clubbing, cyanosis, or edema. +left leg erythema noted with palpable nodules  Pulses: radial and femoral pulses are normal  Neuro: HMF intact. Cranial nerves II through XII grossly normal.  Motor: is 5 over 5 and symmetrical.   Deep tendon reflexes: +2 equal  Psych:appropriate behavior, mood, affect and judgement. Results for orders placed or performed in visit on 11/10/21    PAP SMEAR   Result Value Ref Range    Pap Smear, External normal        No results found for this visit on 02/08/22. Assessment/ Plan:     1. Encounter for hepatitis C screening test for low risk patient   HEPATITIS C AB; Future    2. BMI 45.0-49.9, adult [facility])  Education given on heart healthy diet  CBC WITH AUTOMATED DIFF; Future   LIPID PANEL; Future   HEMOGLOBIN A1C WITH EAG; Future   COLLECTION VENOUS BLOOD,VENIPUNCTURE; Future   METABOLIC PANEL, COMPREHENSIVE; Future       3. Anxiety  Increase Lexapro 20 mg po daily. Education given on monitoring moods, if experience adverse effects when changing dose, call the office immediately    4. Screening for thyroid disorder  TSH 3RD GENERATION; Future      5. Erythema nodosum  Stable-Continue Keflex until regimen complete. Patient to message this office on Thursday to notify if her symptoms persist after completing the antibiotic.        Orders Placed This Encounter    COLLECTION VENOUS BLOOD,VENIPUNCTURE     Standing Status:   Future     Number of Occurrences:   1     Standing Expiration Date:   3/8/2022    HEPATITIS C AB     Standing Status:   Future     Number of Occurrences:   1     Standing Expiration Date:   3/8/2022    CBC WITH AUTOMATED DIFF     Standing Status:   Future     Number of Occurrences:   1     Standing Expiration Date:   3/8/2022    LIPID PANEL     Standing Status:   Future     Number of Occurrences:   1     Standing Expiration Date:   3/8/2022    HEMOGLOBIN A1C WITH EAG     Standing Status:   Future     Number of Occurrences:   1     Standing Expiration Date:   0/4/7233    METABOLIC PANEL, COMPREHENSIVE     Standing Status:   Future     Number of Occurrences:   1     Standing Expiration Date:   3/8/2022    TSH 3RD GENERATION     Standing Status:   Future     Number of Occurrences:   1     Standing Expiration Date:   2/8/2023    escitalopram oxalate (LEXAPRO) 20 mg tablet     Sig: Take 1 Tablet by mouth daily. Dispense:  90 Tablet     Refill:  1       Patient will follow up in one month or sooner if needed. Verbal and written instructions (see AVS) provided. Patient expresses understanding of diagnosis and treatment plan. Health Maintenance Due   Topic Date Due    Hepatitis C Screening  Never done    DTaP/Tdap/Td series (1 - Tdap) Never done     Follow up in 1 month. Follow-up and Dispositions    · Return in about 1 month (around 3/8/2022).            Nelson Dockery, FNP-C

## 2022-02-08 NOTE — PROGRESS NOTES
1. Have you been to the ER, urgent care clinic since your last visit? Hospitalized since your last visit? Yes [facility]  2- skin erruption on left leg,1-27 2022for left      2. Have you seen or consulted any other health care providers outside of the [address] [place] [address] since your last visit? Include any pap smears or colon screening.  No      Chief Complaint   Patient presents with    Shoulder Pain     left f/u    Anxiety     f/u         Visit Vitals  /74 (BP 1 Location: Left upper arm, BP Patient Position: Sitting, BP Cuff Size: Large adult)   Pulse 71   Temp 97.5 °F (36.4 °C) (Temporal)   Resp 14   Ht 5' 4\" (1.626 m)   Wt 268 lb (121.6 kg)   LMP 11/01/2021 (Approximate)   SpO2 99%   BMI 46.00 kg/m²       Pain Scale: 0 - No pain/10  Pain Location:

## 2022-02-09 LAB
ALBUMIN SERPL-MCNC: 3.5 G/DL (ref 3.5–5)
ALBUMIN/GLOB SERPL: 0.9 {RATIO} (ref 1.1–2.2)
ALP SERPL-CCNC: 62 U/L (ref 45–117)
ALT SERPL-CCNC: 92 U/L (ref 12–78)
ANION GAP SERPL CALC-SCNC: 6 MMOL/L (ref 5–15)
AST SERPL-CCNC: 30 U/L (ref 15–37)
BASOPHILS # BLD: 0.1 K/UL (ref 0–0.1)
BASOPHILS NFR BLD: 1 % (ref 0–1)
BILIRUB SERPL-MCNC: <0.1 MG/DL (ref 0.2–1)
BUN SERPL-MCNC: 9 MG/DL (ref 6–20)
BUN/CREAT SERPL: 12 (ref 12–20)
CALCIUM SERPL-MCNC: 9.4 MG/DL (ref 8.5–10.1)
CHLORIDE SERPL-SCNC: 107 MMOL/L (ref 97–108)
CHOLEST SERPL-MCNC: 156 MG/DL
CO2 SERPL-SCNC: 24 MMOL/L (ref 21–32)
CREAT SERPL-MCNC: 0.76 MG/DL (ref 0.55–1.02)
DIFFERENTIAL METHOD BLD: ABNORMAL
EOSINOPHIL # BLD: 0.3 K/UL (ref 0–0.4)
EOSINOPHIL NFR BLD: 3 % (ref 0–7)
ERYTHROCYTE [DISTWIDTH] IN BLOOD BY AUTOMATED COUNT: 12.9 % (ref 11.5–14.5)
EST. AVERAGE GLUCOSE BLD GHB EST-MCNC: 111 MG/DL
GLOBULIN SER CALC-MCNC: 3.9 G/DL (ref 2–4)
GLUCOSE SERPL-MCNC: 100 MG/DL (ref 65–100)
HBA1C MFR BLD: 5.5 % (ref 4–5.6)
HCT VFR BLD AUTO: 41.1 % (ref 35–47)
HCV AB SERPL QL IA: NONREACTIVE
HDLC SERPL-MCNC: 46 MG/DL
HDLC SERPL: 3.4 {RATIO} (ref 0–5)
HGB BLD-MCNC: 12.5 G/DL (ref 11.5–16)
IMM GRANULOCYTES # BLD AUTO: 0 K/UL (ref 0–0.04)
IMM GRANULOCYTES NFR BLD AUTO: 0 % (ref 0–0.5)
LDLC SERPL CALC-MCNC: 87.4 MG/DL (ref 0–100)
LYMPHOCYTES # BLD: 2.2 K/UL (ref 0.8–3.5)
LYMPHOCYTES NFR BLD: 22 % (ref 12–49)
MCH RBC QN AUTO: 25.9 PG (ref 26–34)
MCHC RBC AUTO-ENTMCNC: 30.4 G/DL (ref 30–36.5)
MCV RBC AUTO: 85.3 FL (ref 80–99)
MONOCYTES # BLD: 0.4 K/UL (ref 0–1)
MONOCYTES NFR BLD: 4 % (ref 5–13)
NEUTS SEG # BLD: 7.1 K/UL (ref 1.8–8)
NEUTS SEG NFR BLD: 70 % (ref 32–75)
NRBC # BLD: 0 K/UL (ref 0–0.01)
NRBC BLD-RTO: 0 PER 100 WBC
PLATELET # BLD AUTO: 262 K/UL (ref 150–400)
PMV BLD AUTO: 12.9 FL (ref 8.9–12.9)
POTASSIUM SERPL-SCNC: 4.1 MMOL/L (ref 3.5–5.1)
PROT SERPL-MCNC: 7.4 G/DL (ref 6.4–8.2)
RBC # BLD AUTO: 4.82 M/UL (ref 3.8–5.2)
SODIUM SERPL-SCNC: 137 MMOL/L (ref 136–145)
TRIGL SERPL-MCNC: 113 MG/DL (ref ?–150)
TSH SERPL DL<=0.05 MIU/L-ACNC: 0.95 UIU/ML (ref 0.36–3.74)
VLDLC SERPL CALC-MCNC: 22.6 MG/DL
WBC # BLD AUTO: 10.1 K/UL (ref 3.6–11)

## 2022-02-12 RX ORDER — FLUCONAZOLE 150 MG/1
150 TABLET ORAL DAILY
Qty: 1 TABLET | Refills: 0 | Status: SHIPPED | OUTPATIENT
Start: 2022-02-12 | End: 2022-02-13

## 2022-03-18 PROBLEM — F41.9 ANXIETY: Status: ACTIVE | Noted: 2022-01-25

## 2022-03-19 PROBLEM — Z88.9 HISTORY OF SEASONAL ALLERGIES: Status: ACTIVE | Noted: 2022-01-25

## 2022-03-20 PROBLEM — K21.9 GASTROESOPHAGEAL REFLUX DISEASE WITHOUT ESOPHAGITIS: Status: ACTIVE | Noted: 2022-01-25

## 2022-04-25 ENCOUNTER — OFFICE VISIT (OUTPATIENT)
Dept: FAMILY MEDICINE CLINIC | Age: 32
End: 2022-04-25
Payer: COMMERCIAL

## 2022-04-25 VITALS
HEART RATE: 78 BPM | SYSTOLIC BLOOD PRESSURE: 130 MMHG | WEIGHT: 284 LBS | TEMPERATURE: 97.4 F | HEIGHT: 64 IN | OXYGEN SATURATION: 99 % | RESPIRATION RATE: 18 BRPM | DIASTOLIC BLOOD PRESSURE: 80 MMHG | BODY MASS INDEX: 48.49 KG/M2

## 2022-04-25 DIAGNOSIS — M89.8X1 CHRONIC SCAPULAR PAIN: Primary | ICD-10-CM

## 2022-04-25 DIAGNOSIS — F41.9 ANXIETY: ICD-10-CM

## 2022-04-25 DIAGNOSIS — G89.29 CHRONIC SCAPULAR PAIN: Primary | ICD-10-CM

## 2022-04-25 PROCEDURE — 99214 OFFICE O/P EST MOD 30 MIN: CPT | Performed by: NURSE PRACTITIONER

## 2022-04-25 NOTE — PROGRESS NOTES
1. \"Have you been to the ER, urgent care clinic since your last visit? Hospitalized since your last visit? \" No    2. \"Have you seen or consulted any other health care providers outside of the [address] [place] [address] since your last visit? \" No     3. For patients aged 39-70: Has the patient had a colonoscopy / FIT/ Cologuard? NA - based on age      If the patient is female:    4. For patients aged 41-77: Has the patient had a mammogram within the past 2 years? NA - based on age or sex      11. For patients aged 21-65: Has the patient had a pap smear? Yes - Care Gap present.  Rooming MA/LPN to request most recent results

## 2022-04-27 NOTE — PROGRESS NOTES
Subjective:     Brianna Noble is a 28 y.o. female who presents today with the following:  Chief Complaint   Patient presents with    Anxiety     f/u    Weight Gain    Shoulder Pain     left, f/u       Patient Active Problem List   Diagnosis Code    Gastroesophageal reflux disease without esophagitis K21.9    Anxiety F41.9    History of seasonal allergies Z88.9         COMPLIANT WITH MEDICATION:   HTN; Denies chest pain, dyspnea, palpitations, headache and blurred vision. Blood pressure normotensive. BMI discussed BMI with her. She endorses weight gain  And decreased sex drive since starting Lexapro. She plans to check with insurance for coverage for weight loss interventions. Anxiety; Lexapro effective She endorses no crying for no reason and no panic attacks. Scapula pain; x 3 weeks. She endorses daily discomfort medial aspect of the left shoulder blade. Improves with massage. She denies injury or trauma. depression screening addressed not at risk    abuse screening addressed denies    learning assessment addressed reviewed nurses notes    fall risk addressed not at risk    HM: addressed requested records from [facility] for PAP . ROS:  Gen: denies fever, chills, fatigue, weight loss, weight gain  HEENT:denies blurry vision, nasal congestion, sore throat  Resp: denies dypsnea, cough, wheezing  CV: denies chest pain radiating to the jaws or arms, palpitations, lower extremity edema  Abd: denies nausea, vomiting, diarrhea, constipation  Neuro: denies numbness/tingling  Endo: denies polyuria, polydipsia, heat/cold intolerance  Heme: no lymphadenopathy    Allergies   Allergen Reactions    Milk Nausea Only    Tree Nuts Other (comments)     Per allergy testing         Current Outpatient Medications:     escitalopram oxalate (LEXAPRO) 20 mg tablet, Take 1 Tablet by mouth daily. , Disp: 90 Tablet, Rfl: 1    ondansetron (ZOFRAN ODT) 4 mg disintegrating tablet, Take 1 Tablet by mouth every eight (8) hours as needed for Nausea or Vomiting., Disp: 20 Tablet, Rfl: 2    Junel 1.5/30, 21, 1.5-30 mg-mcg tab, 1 Tablet daily. , Disp: , Rfl:     Cetirizine (ZyrTEC) 10 mg cap, 1 Tablet daily. , Disp: , Rfl:     cholecalciferol, vitamin D3, (VITAMIN D3 PO), daily. , Disp: , Rfl:     OMEPRAZOLE PO, 1 Tablet daily. , Disp: , Rfl:     multivitamin capsule, 1 Tablet daily. Gummies, Disp: , Rfl:     naproxen (NAPROSYN) 500 mg tablet, Take 1 Tablet by mouth every twelve (12) hours as needed for Pain. (Patient not taking: Reported on 2/8/2022), Disp: 20 Tablet, Rfl: 0    Past Medical History:   Diagnosis Date    Anxiety     GERD (gastroesophageal reflux disease)     History of seasonal allergies        Past Surgical History:   Procedure Laterality Date    HX WISDOM TEETH EXTRACTION         Social History     Tobacco Use   Smoking Status Never Smoker   Smokeless Tobacco Never Used       Social History     Socioeconomic History    Marital status:    Tobacco Use    Smoking status: Never Smoker    Smokeless tobacco: Never Used   Vaping Use    Vaping Use: Never used   Substance and Sexual Activity    Alcohol use: Yes     Comment: Social    Drug use: Never    Sexual activity: Never       Family History   Problem Relation Age of Onset    Gall Bladder Disease Mother     Anxiety Father     Depression Father          Objective:     Visit Vitals  /80 (BP 1 Location: Right upper arm, BP Patient Position: Sitting, BP Cuff Size: Large adult)   Pulse 78   Temp 97.4 °F (36.3 °C) (Temporal)   Resp 18   Ht 5' 4\" (1.626 m)   Wt 284 lb (128.8 kg)   LMP 11/15/2021 (Approximate)   SpO2 99%   BMI 48.75 kg/m²     Body mass index is 48.75 kg/m². General: Alert and oriented. No acute distress. Well nourished, obese. HEENT :  Ears:TMs are normal. Canals are clear. Eyes: pupils equal, round, react to light and accommodation. Extra ocular movements intact. Nose: patent.  Mouth and throat is clear.  Neck:supple full range of motion no thyromegaly. Trachea midline, No carotid bruits. No significant lymphadenopathy  Lungs[de-identified] clear to auscultation without wheezes, rales, or rhonchi. Heart :RRR, S1 & S2 are normal intensity. No murmur; no gallop  Abdomen: bowel sounds active. No tenderness, guarding, rebound, masses, hepatic or spleen enlargement  Back: no CVA tenderness. Extremities: without clubbing, cyanosis, or edema  Pulses: radial and femoral pulses are normal  Neuro: HMF intact. Cranial nerves II through XII grossly normal.  Motor: is 5 over 5 and symmetrical.   Deep tendon reflexes: +2 equal  Psych:appropriate behavior, mood, affect and judgement. Results for orders placed or performed in visit on 02/08/22   TSH 3RD GENERATION   Result Value Ref Range    TSH 0.95 0.36 - 6.15 uIU/mL   METABOLIC PANEL, COMPREHENSIVE   Result Value Ref Range    Sodium 137 136 - 145 mmol/L    Potassium 4.1 3.5 - 5.1 mmol/L    Chloride 107 97 - 108 mmol/L    CO2 24 21 - 32 mmol/L    Anion gap 6 5 - 15 mmol/L    Glucose 100 65 - 100 mg/dL    BUN 9 6 - 20 MG/DL    Creatinine 0.76 0.55 - 1.02 MG/DL    BUN/Creatinine ratio 12 12 - 20      GFR est AA >60 >60 ml/min/1.73m2    GFR est non-AA >60 >60 ml/min/1.73m2    Calcium 9.4 8.5 - 10.1 MG/DL    Bilirubin, total <0.1 (L) 0.2 - 1.0 MG/DL    ALT (SGPT) 92 (H) 12 - 78 U/L    AST (SGOT) 30 15 - 37 U/L    Alk.  phosphatase 62 45 - 117 U/L    Protein, total 7.4 6.4 - 8.2 g/dL    Albumin 3.5 3.5 - 5.0 g/dL    Globulin 3.9 2.0 - 4.0 g/dL    A-G Ratio 0.9 (L) 1.1 - 2.2     HEMOGLOBIN A1C WITH EAG   Result Value Ref Range    Hemoglobin A1c 5.5 4.0 - 5.6 %    Est. average glucose 111 mg/dL   LIPID PANEL   Result Value Ref Range    Cholesterol, total 156 <200 MG/DL    Triglyceride 113 <150 MG/DL    HDL Cholesterol 46 MG/DL    LDL, calculated 87.4 0 - 100 MG/DL    VLDL, calculated 22.6 MG/DL    CHOL/HDL Ratio 3.4 0.0 - 5.0     CBC WITH AUTOMATED DIFF   Result Value Ref Range    WBC 10.1 3.6 - 11.0 K/uL    RBC 4.82 3.80 - 5.20 M/uL    HGB 12.5 11.5 - 16.0 g/dL    HCT 41.1 35.0 - 47.0 %    MCV 85.3 80.0 - 99.0 FL    MCH 25.9 (L) 26.0 - 34.0 PG    MCHC 30.4 30.0 - 36.5 g/dL    RDW 12.9 11.5 - 14.5 %    PLATELET 097 691 - 984 K/uL    MPV 12.9 8.9 - 12.9 FL    NRBC 0.0 0  WBC    ABSOLUTE NRBC 0.00 0.00 - 0.01 K/uL    NEUTROPHILS 70 32 - 75 %    LYMPHOCYTES 22 12 - 49 %    MONOCYTES 4 (L) 5 - 13 %    EOSINOPHILS 3 0 - 7 %    BASOPHILS 1 0 - 1 %    IMMATURE GRANULOCYTES 0 0.0 - 0.5 %    ABS. NEUTROPHILS 7.1 1.8 - 8.0 K/UL    ABS. LYMPHOCYTES 2.2 0.8 - 3.5 K/UL    ABS. MONOCYTES 0.4 0.0 - 1.0 K/UL    ABS. EOSINOPHILS 0.3 0.0 - 0.4 K/UL    ABS. BASOPHILS 0.1 0.0 - 0.1 K/UL    ABS. IMM. GRANS. 0.0 0.00 - 0.04 K/UL    DF AUTOMATED     HEPATITIS C AB   Result Value Ref Range    Hep C virus Ab Interp. NONREACTIVE NONREACTIVE         No results found for this visit on 04/25/22. Assessment/ Plan:     1. Chronic scapular pain    - XR SCAPULA LT; Future    Consider PT considering if indicated     2. Anxiety  Lexapro effective She endorses no panic attacks and tearful episodes with no reason. 3. BMI 45.0-49.9, adult [facility])  Discussed the patient's BMI with her. The BMI follow up plan is as follows:     dietary management education, guidance, and counseling  encourage exercise  monitor weight  prescribed dietary intake    An After Visit Summary was printed and given to the patient. Orders Placed This Encounter    XR SCAPULA LT     Standing Status:   Future     Standing Expiration Date:   8/25/2022     Order Specific Question:   Is Patient Pregnant? Answer:   No     Order Specific Question:   Reason for Exam     Answer:   request from radiologist and continued discomfort in and around the scapula     Order Specific Question:   Which facility to perform procedure? Answer:   [facility]         Verbal and written instructions (see AVS) provided.   Patient expresses understanding of diagnosis and treatment plan. Health Maintenance Due   Topic Date Due    DTaP/Tdap/Td series (1 - Tdap) Never done       Follow up in 4 to 6 weeks.          RICH Cooper-C

## 2022-04-28 ENCOUNTER — HOSPITAL ENCOUNTER (EMERGENCY)
Age: 32
Discharge: HOME | End: 2022-04-28
Attending: EMERGENCY MEDICINE
Payer: COMMERCIAL

## 2022-04-28 VITALS
OXYGEN SATURATION: 98 % | BODY MASS INDEX: 39.25 KG/M2 | WEIGHT: 265 LBS | RESPIRATION RATE: 18 BRPM | SYSTOLIC BLOOD PRESSURE: 138 MMHG | DIASTOLIC BLOOD PRESSURE: 90 MMHG | TEMPERATURE: 98.1 F | HEART RATE: 96 BPM | HEIGHT: 69 IN

## 2022-04-28 DIAGNOSIS — H10.32 ACUTE CONJUNCTIVITIS OF LEFT EYE, UNSPECIFIED ACUTE CONJUNCTIVITIS TYPE: Primary | ICD-10-CM

## 2022-04-28 PROCEDURE — 74011000250 HC RX REV CODE- 250: Performed by: EMERGENCY MEDICINE

## 2022-04-28 PROCEDURE — 99283 EMERGENCY DEPT VISIT LOW MDM: CPT

## 2022-04-28 PROCEDURE — 74011250637 HC RX REV CODE- 250/637: Performed by: EMERGENCY MEDICINE

## 2022-04-28 RX ORDER — TETRACAINE HYDROCHLORIDE 5 MG/ML
1 SOLUTION OPHTHALMIC
Status: COMPLETED | OUTPATIENT
Start: 2022-04-28 | End: 2022-04-28

## 2022-04-28 RX ORDER — CEPHALEXIN 500 MG/1
500 CAPSULE ORAL 3 TIMES DAILY
Qty: 15 CAPSULE | Refills: 0 | Status: SHIPPED | OUTPATIENT
Start: 2022-04-28 | End: 2022-06-27 | Stop reason: ALTCHOICE

## 2022-04-28 RX ORDER — CEPHALEXIN 250 MG/1
500 CAPSULE ORAL
Status: COMPLETED | OUTPATIENT
Start: 2022-04-28 | End: 2022-04-28

## 2022-04-28 RX ORDER — CIPROFLOXACIN HYDROCHLORIDE 3.5 MG/ML
2 SOLUTION/ DROPS TOPICAL
Status: COMPLETED | OUTPATIENT
Start: 2022-04-28 | End: 2022-04-28

## 2022-04-28 RX ADMIN — FLUORESCEIN SODIUM 1 STRIP: 1 STRIP OPHTHALMIC at 17:35

## 2022-04-28 RX ADMIN — CEPHALEXIN 500 MG: 250 CAPSULE ORAL at 18:08

## 2022-04-28 RX ADMIN — CIPROFLOXACIN 2 DROP: 3 SOLUTION OPHTHALMIC at 18:09

## 2022-04-28 RX ADMIN — TETRACAINE HYDROCHLORIDE 1 DROP: 5 SOLUTION OPHTHALMIC at 17:35

## 2022-04-28 NOTE — Clinical Note
[address] [place] [address] EMERGENCY DEP  [address] [facility] [address] [place]li [place] [address]  [phone]    Work/School Note    Date: 4/28/2022    To Whom It May concern:    Marcos Nguyen was seen and treated today in the emergency room by the following provider(s):  Attending Provider: Warner Norwood DO. Marcos Nguyen is excused from work/school on 04/28/22 and 04/29/22. She is medically clear to return to work/school on 4/30/2022.        Sincerely,          Parris Dakin, DO

## 2022-04-28 NOTE — Clinical Note
[address] [place] [address] EMERGENCY DEP  [address] [place] [address] Dr Yamilka Feliz [address]  [phone]    Work/School Note    Date: 4/28/2022    To Whom It May concern:      Wendy Scott was seen and treated today in the emergency room by the following provider(s):  Attending Provider: Liliana Kirk DO. Wendy Scott is excused from work/school on 04/28/22. She is clear to return to work/school on 04/29/22.         Sincerely,          Hilda Yip DO

## 2022-04-28 NOTE — ED TRIAGE NOTES
Patient presents to the ED with a complaint of left eye pain and redness. Per the patient the pain started yesterday. Patient complains of increase in pain when she opens her eyes. Denies injury.

## 2022-04-30 NOTE — ED PROVIDER NOTES
EMERGENCY DEPARTMENT HISTORY AND PHYSICAL EXAM      Date: 4/28/2022  Patient Name: Madalyn Hernandez    History of Presenting Illness     Chief Complaint   Patient presents with    Eye Pain       History Provided By: Patient    HPI: Madalyn Hernandez, 28 y.o. female presents to the ED with cc of left eye pain. Patient states she wears contacts. She states that she has had a runny nose and began with left eye itching 2 days ago. She states that she had thought that this was related to allergies and had been using over-the-counter eyedrops but her symptoms had not improved. She states today she noted increased redness to the eye and was having pain. She states pain rated 8 out of 10 worse with lights. She states that she has been rubbing her cheek and her left eye because of the itching and thought that the redness was because of that. She denies any crust or drainage from the eye. She had noted increased tearing. There is no blurred vision or vision loss. She denies any fever or chills. She denies any headache. She denies any chest pain, shortness of breath, nausea or vomiting. There are no other complaints, changes, or physical findings at this time. PCP: Abel Umana NP    No current facility-administered medications on file prior to encounter. Current Outpatient Medications on File Prior to Encounter   Medication Sig Dispense Refill    escitalopram oxalate (LEXAPRO) 20 mg tablet Take 1 Tablet by mouth daily. 90 Tablet 1    ondansetron (ZOFRAN ODT) 4 mg disintegrating tablet Take 1 Tablet by mouth every eight (8) hours as needed for Nausea or Vomiting. 20 Tablet 2    Junel 1.5/30, 21, 1.5-30 mg-mcg tab 1 Tablet daily.  Cetirizine (ZyrTEC) 10 mg cap 1 Tablet daily.  cholecalciferol, vitamin D3, (VITAMIN D3 PO) daily.  OMEPRAZOLE PO 1 Tablet daily.  multivitamin capsule 1 Tablet daily.  Gummies         Past History     Past Medical History:  Past Medical History: Diagnosis Date    Anxiety     GERD (gastroesophageal reflux disease)     History of seasonal allergies        Past Surgical History:  Past Surgical History:   Procedure Laterality Date    HX WISDOM TEETH EXTRACTION         Family History:  Family History   Problem Relation Age of Onset    Gall Bladder Disease Mother     Anxiety Father     Depression Father        Social History:  Social History     Tobacco Use    Smoking status: Never Smoker    Smokeless tobacco: Never Used   Vaping Use    Vaping Use: Never used   Substance Use Topics    Alcohol use: Yes     Comment: Social    Drug use: Never       Allergies: Allergies   Allergen Reactions    Milk Nausea Only    Tree Nuts Other (comments)     Per allergy testing         Review of Systems   Review of Systems   Constitutional: Negative. Negative for chills, fatigue and fever. HENT: Positive for facial swelling and rhinorrhea. Negative for congestion and sore throat. Eyes: Positive for photophobia, pain, redness and itching. Negative for visual disturbance. Respiratory: Negative. Cardiovascular: Negative. Gastrointestinal: Negative for nausea and vomiting. Skin:        Facial erythema of the left cheek and left periorbital region   Neurological: Negative. Negative for facial asymmetry, speech difficulty, light-headedness and headaches. Psychiatric/Behavioral: Negative. Physical Exam   Physical Exam  Vitals and nursing note reviewed. Constitutional:       General: She is not in acute distress. Appearance: Normal appearance. She is obese. She is not ill-appearing. HENT:      Head: Normocephalic and atraumatic. Nose: Nose normal.      Mouth/Throat:      Mouth: Mucous membranes are moist.   Eyes:      General: No scleral icterus. Right eye: No discharge. Left eye: No discharge. Extraocular Movements: Extraocular movements intact. Pupils: Pupils are equal, round, and reactive to light. Comments: Conjunctival erythema, scleral injection, tearing, no photophobia, pupils equal reactive no consensual photophobia, mild left periorbital swelling and erythema   Cardiovascular:      Rate and Rhythm: Normal rate and regular rhythm. Pulmonary:      Effort: Pulmonary effort is normal. No respiratory distress. Musculoskeletal:      Cervical back: Normal range of motion and neck supple. Skin:     General: Skin is warm and dry. Capillary Refill: Capillary refill takes less than 2 seconds. Neurological:      Mental Status: She is alert and oriented to person, place, and time. Cranial Nerves: No cranial nerve deficit. Comments: No gross motor or sensory deficits   Psychiatric:         Mood and Affect: Mood normal.         Behavior: Behavior normal.         Diagnostic Study Results     Labs -  None  Radiologic Studies -   No orders to display     CT Results  (Last 48 hours)    None        CXR Results  (Last 48 hours)    None          Medical Decision Making   I am the first provider for this patient. I reviewed the vital signs, available nursing notes, past medical history, past surgical history, family history and social history. Vital Signs-Reviewed the patient's vital signs. Records Reviewed: Nursing Notes, Old Medical Records, Previous Radiology Studies and Previous Laboratory Studies, patient with history of anxiety, and seasonal allergies. Provider Notes (Medical Decision Making):   DDx-conjunctivitis, corneal abrasion, seasonal allergies, periorbital cellulitis, skin irritation    ED Course:   Initial assessment performed. The patients presenting problems have been discussed, and they are in agreement with the care plan formulated and outlined with them. I have encouraged them to ask questions as they arise throughout their visit. VIDAL [address] Exam  Left eye- tetracaine drops placed, with Fluorescein strip. No uptake noted. No FB seen.      Pt wears contacts, there is scleral and conjunctival erythema. Will place pt on Ciloxin drops. In addition patient's left cheek and periorbital swelling and mild erythema. Patient states this is from where she has been rubbing her eye however will place on oral antibiotics as well to cover for periorbital cellulitis. Disposition:  Discharge home     DISCHARGE PLAN:  1. Discharge Medication List as of 4/28/2022  6:25 PM      START taking these medications    Details   cephALEXin (Keflex) 500 mg capsule Take 1 Capsule by mouth three (3) times daily. , Normal, Disp-15 Capsule, R-0         CONTINUE these medications which have NOT CHANGED    Details   escitalopram oxalate (LEXAPRO) 20 mg tablet Take 1 Tablet by mouth daily. , Normal, Disp-90 Tablet, R-1      ondansetron (ZOFRAN ODT) 4 mg disintegrating tablet Take 1 Tablet by mouth every eight (8) hours as needed for Nausea or Vomiting., Normal, Disp-20 Tablet, R-2      Junel 1.5/30, 21, 1.5-30 mg-mcg tab 1 Tablet daily. , Historical Med, SHARON      Cetirizine (ZyrTEC) 10 mg cap 1 Tablet daily. , Historical Med      cholecalciferol, vitamin D3, (VITAMIN D3 PO) daily. , Historical Med      OMEPRAZOLE PO 1 Tablet daily. , Historical Med      multivitamin capsule 1 Tablet daily. Gummies, Historical Med           2. Follow-up Information     Follow up With Specialties Details Why Contact Adin Boland NP Nurse Practitioner  Eye doctor if symptoms worsen [address] [phone]          3. Return to ED if worse     Diagnosis     Clinical Impression:   1. Acute conjunctivitis of left eye, unspecified acute conjunctivitis type        Attestations:    Juanita Avila, DO    Please note that this dictation was completed with [employer], the [employer] voice recognition software. Quite often unanticipated grammatical, syntax, homophones, and other interpretive errors are inadvertently transcribed by the computer software. Please disregard these errors. Please excuse any errors that have escaped final proofreading. Thank you.

## 2022-05-07 ENCOUNTER — HOSPITAL ENCOUNTER (OUTPATIENT)
Dept: GENERAL RADIOLOGY | Age: 32
Discharge: HOME | End: 2022-05-07
Payer: COMMERCIAL

## 2022-05-07 DIAGNOSIS — M89.8X1 CHRONIC SCAPULAR PAIN: ICD-10-CM

## 2022-05-07 DIAGNOSIS — G89.29 CHRONIC SCAPULAR PAIN: ICD-10-CM

## 2022-05-07 PROCEDURE — 73010 X-RAY EXAM OF SHOULDER BLADE: CPT

## 2022-05-08 NOTE — PROGRESS NOTES
No acute abnormality. Consider continued discomfort muscle strain. If continues to persist recommend PT and further imaging if indicated.

## 2022-05-09 NOTE — PROGRESS NOTES
Sp/w Pt she has reviewed her results via [employer]. She is interested in PT, would like to go to [address] [facility] in [place]. Please enter a referral. I will send pt phone number to schedule appt.  KT

## 2022-05-10 NOTE — PROGRESS NOTES
Referral, office note and xrays faxed to [address] [facility] [address]137. Pt to schedule appt.  KT

## 2022-06-27 ENCOUNTER — OFFICE VISIT (OUTPATIENT)
Dept: FAMILY MEDICINE CLINIC | Age: 32
End: 2022-06-27
Payer: COMMERCIAL

## 2022-06-27 VITALS
OXYGEN SATURATION: 98 % | HEART RATE: 76 BPM | DIASTOLIC BLOOD PRESSURE: 80 MMHG | TEMPERATURE: 97 F | SYSTOLIC BLOOD PRESSURE: 120 MMHG | BODY MASS INDEX: 42.21 KG/M2 | HEIGHT: 69 IN | RESPIRATION RATE: 16 BRPM | WEIGHT: 285 LBS

## 2022-06-27 DIAGNOSIS — G89.29 CHRONIC SCAPULAR PAIN: ICD-10-CM

## 2022-06-27 DIAGNOSIS — M25.512 CHRONIC LEFT SHOULDER PAIN: Primary | ICD-10-CM

## 2022-06-27 DIAGNOSIS — G89.29 CHRONIC LEFT SHOULDER PAIN: Primary | ICD-10-CM

## 2022-06-27 DIAGNOSIS — F41.9 ANXIETY: ICD-10-CM

## 2022-06-27 DIAGNOSIS — R11.0 NAUSEA: ICD-10-CM

## 2022-06-27 DIAGNOSIS — M89.8X1 CHRONIC SCAPULAR PAIN: ICD-10-CM

## 2022-06-27 PROCEDURE — 99214 OFFICE O/P EST MOD 30 MIN: CPT | Performed by: NURSE PRACTITIONER

## 2022-06-27 NOTE — PROGRESS NOTES
1. \"Have you been to the ER, urgent care clinic since your last visit? Hospitalized since your last visit? \"  Yes [facility] ER  For conjunctivitis in Spring 2022     2. \"Have you seen or consulted any other health care providers outside of the [address] [place] [address] since your last visit? \" No     3. For patients aged 39-70: Has the patient had a colonoscopy / FIT/ Cologuard? NA - based on age      If the patient is female:    4. For patients aged 41-77: Has the patient had a mammogram within the past 2 years? NA - based on age or sex      11. For patients aged 21-65: Has the patient had a pap smear?  Yes - no Care Gap present

## 2022-06-29 ENCOUNTER — TELEPHONE (OUTPATIENT)
Dept: FAMILY MEDICINE CLINIC | Age: 32
End: 2022-06-29

## 2022-06-29 NOTE — TELEPHONE ENCOUNTER
----- Message from Giana Arreguin NP sent at 6/29/2022  4:26 AM EDT -----  Please call patient to see if she wants to stay on lexapro or switch to another SSRI. Safe to use for planning pregnancy   They can cause weight gain.    Selective serotonin reuptake inhibitors (SSRIs)  Citalopram (Celexa)  Escitalopram (Lexapro)  Fluoxetine (Prozac)  Paroxetine (Paxil)  Sertraline (Zoloft) X not tolerated

## 2022-06-29 NOTE — PATIENT INSTRUCTIONS
Nausea and Vomiting: Care Instructions  Overview     When you are nauseated, you may feel weak and sweaty and notice a lot of saliva in your mouth. Nausea often leads to vomiting. Most of the time you do not need to worry about nausea and vomiting, but they can be signs of other illnesses. Two common causes of nausea and vomiting are a stomach infection and food poisoning. Nausea and vomiting from a viral stomach infection will usually start to improve within 24 hours. Nausea and vomiting from food poisoning may last from 12 to 48 hours. The doctor has checked you carefully, but problems can develop later. If you notice any problems or new symptoms, get medical treatment right away. Follow-up care is a key part of your treatment and safety. Be sure to make and go to all appointments, and call your doctor if you are having problems. It's also a good idea to know your test results and keep a list of the medicines you take. How can you care for yourself at home? · To prevent dehydration, drink plenty of fluids. Choose water and other clear liquids until you feel better. If you have kidney, heart, or liver disease and have to limit fluids, talk with your doctor before you increase the amount of fluids you drink. · Rest in bed until you feel better. · When you are able to eat, try clear soups, mild foods, and liquids until all symptoms are gone for 12 to 48 hours. Other good choices include dry toast, crackers, cooked cereal, and gelatin dessert, such as Jell-O. When should you call for help? Call 911 anytime you think you may need emergency care. For example, call if:    · You passed out (lost consciousness). Call your doctor now or seek immediate medical care if:    · You have symptoms of dehydration, such as:  ? Dry eyes and a dry mouth. ? Passing only a little urine. ?  Feeling thirstier than usual.     · You have new or worsening belly pain.     · You have a new or higher fever.     · You vomit blood or what looks like coffee grounds. Watch closely for changes in your health, and be sure to contact your doctor if:    · You have ongoing nausea and vomiting.     · Your vomiting is getting worse.     · Your vomiting lasts longer than 2 days.     · You are not getting better as expected. Where can you learn more? Go to http://www.parra.com/  Enter H591 in the search box to learn more about \"Nausea and Vomiting: Care Instructions. \"  Current as of: July 1, 2021               Content Version: 13.2  © 1353-8681 [employer]. Care instructions adapted under license by [employer] (which disclaims liability or warranty for this information). If you have questions about a medical condition or this instruction, always ask your healthcare professional. [address] any warranty or liability for your use of this information.

## 2022-06-29 NOTE — PROGRESS NOTES
Subjective:     Shanice Lester is a 28 y.o. female who presents today with the following:  Chief Complaint   Patient presents with    Shoulder Pain     f/u left       Patient Active Problem List   Diagnosis Code    Gastroesophageal reflux disease without esophagitis K21.9    Anxiety F41.9    History of seasonal allergies Z88.9         COMPLIANT WITH MEDICATION:    Denies chest pain, dyspnea, palpitations, headache and blurred vision. Blood pressure normotensive. Scapula/shoulder pain; No change since last visit. She was unable to go to PT because they did not take her insurance. Massage and counter pressure helps temporarily. depression screening addressed not at risk    abuse screening addressed denies    learning assessment addressed reviewed nurses notes    fall risk addressed not at risk    HM: addressed    ROS:  Gen: denies fever, chills, fatigue, weight loss, weight gain  HEENT:denies blurry vision, nasal congestion, sore throat  Resp: denies dypsnea, cough, wheezing  CV: denies chest pain radiating to the jaws or arms, palpitations, lower extremity edema  Abd: denies nausea, vomiting, diarrhea, constipation  Neuro: denies numbness/tingling  Endo: denies polyuria, polydipsia, heat/cold intolerance  Heme: no lymphadenopathy    Allergies   Allergen Reactions    Milk Nausea Only    Tree Nuts Other (comments)     Per allergy testing         Current Outpatient Medications:     escitalopram oxalate (LEXAPRO) 20 mg tablet, Take 1 Tablet by mouth daily. , Disp: 90 Tablet, Rfl: 1    ondansetron (ZOFRAN ODT) 4 mg disintegrating tablet, Take 1 Tablet by mouth every eight (8) hours as needed for Nausea or Vomiting., Disp: 20 Tablet, Rfl: 2    Junel 1.5/30, 21, 1.5-30 mg-mcg tab, 1 Tablet daily. , Disp: , Rfl:     Cetirizine (ZyrTEC) 10 mg cap, 1 Tablet daily. , Disp: , Rfl:     cholecalciferol, vitamin D3, (VITAMIN D3 PO), daily. , Disp: , Rfl:     OMEPRAZOLE PO, 1 Tablet daily. , Disp: , Rfl:    multivitamin capsule, 1 Tablet daily. Gummies, Disp: , Rfl:     Past Medical History:   Diagnosis Date    Anxiety     GERD (gastroesophageal reflux disease)     History of seasonal allergies        Past Surgical History:   Procedure Laterality Date    HX WISDOM TEETH EXTRACTION         Social History     Tobacco Use   Smoking Status Never Smoker   Smokeless Tobacco Never Used       Social History     Socioeconomic History    Marital status:    Tobacco Use    Smoking status: Never Smoker    Smokeless tobacco: Never Used   Vaping Use    Vaping Use: Never used   Substance and Sexual Activity    Alcohol use: Yes     Comment: Social    Drug use: Never    Sexual activity: Never     Social Determinants of Health     Financial Resource Strain: Low Risk     Difficulty of Paying Living Expenses: Not hard at all   Food Insecurity: No Food Insecurity    Worried About Running Out of Food in the Last Year: Never true    Sorin of Food in the Last Year: Never true   Transportation Needs: No Transportation Needs    Lack of Transportation (Medical): No    Lack of Transportation (Non-Medical): No       Family History   Problem Relation Age of Onset    Gall Bladder Disease Mother     Anxiety Father     Depression Father          Objective:     Visit Vitals  /80 (BP 1 Location: Right upper arm, BP Patient Position: Sitting, BP Cuff Size: Adult long)   Pulse 76   Temp 97 °F (36.1 °C) (Temporal)   Resp 16   Ht 5' 9\" (1.753 m)   Wt 285 lb (129.3 kg)   LMP 11/15/2021 (Approximate)   SpO2 98%   BMI 42.09 kg/m²     Body mass index is 42.09 kg/m². General: Alert and oriented. No acute distress. Well nourished,obese  HEENT :  Ears:TMs are normal. Canals are clear. Eyes: pupils equal, round, react to light and accommodation. Extra ocular movements intact. Nose: patent. Mouth and throat is clear. Neck:supple full range of motion no thyromegaly. Trachea midline, No carotid bruits.  No significant lymphadenopathy  Lungs[de-identified] clear to auscultation without wheezes, rales, or rhonchi. Heart :RRR, S1 & S2 are normal intensity. No murmur; no gallop  Abdomen: bowel sounds active. No tenderness, guarding, rebound, masses, hepatic or spleen enlargement  Back: no CVA tenderness. Extremities: without clubbing, cyanosis, or edema  Pulses: radial and femoral pulses are normal  Neuro: HMF intact. Cranial nerves II through XII grossly normal.  Motor: is 5 over 5 and symmetrical.   Deep tendon reflexes: +2 equal  Psych:appropriate behavior, mood, affect and judgement. Results for orders placed or performed in visit on 02/08/22   TSH 3RD GENERATION   Result Value Ref Range    TSH 0.95 0.36 - 9.15 uIU/mL   METABOLIC PANEL, COMPREHENSIVE   Result Value Ref Range    Sodium 137 136 - 145 mmol/L    Potassium 4.1 3.5 - 5.1 mmol/L    Chloride 107 97 - 108 mmol/L    CO2 24 21 - 32 mmol/L    Anion gap 6 5 - 15 mmol/L    Glucose 100 65 - 100 mg/dL    BUN 9 6 - 20 MG/DL    Creatinine 0.76 0.55 - 1.02 MG/DL    BUN/Creatinine ratio 12 12 - 20      GFR est AA >60 >60 ml/min/1.73m2    GFR est non-AA >60 >60 ml/min/1.73m2    Calcium 9.4 8.5 - 10.1 MG/DL    Bilirubin, total <0.1 (L) 0.2 - 1.0 MG/DL    ALT (SGPT) 92 (H) 12 - 78 U/L    AST (SGOT) 30 15 - 37 U/L    Alk.  phosphatase 62 45 - 117 U/L    Protein, total 7.4 6.4 - 8.2 g/dL    Albumin 3.5 3.5 - 5.0 g/dL    Globulin 3.9 2.0 - 4.0 g/dL    A-G Ratio 0.9 (L) 1.1 - 2.2     HEMOGLOBIN A1C WITH EAG   Result Value Ref Range    Hemoglobin A1c 5.5 4.0 - 5.6 %    Est. average glucose 111 mg/dL   LIPID PANEL   Result Value Ref Range    Cholesterol, total 156 <200 MG/DL    Triglyceride 113 <150 MG/DL    HDL Cholesterol 46 MG/DL    LDL, calculated 87.4 0 - 100 MG/DL    VLDL, calculated 22.6 MG/DL    CHOL/HDL Ratio 3.4 0.0 - 5.0     CBC WITH AUTOMATED DIFF   Result Value Ref Range    WBC 10.1 3.6 - 11.0 K/uL    RBC 4.82 3.80 - 5.20 M/uL    HGB 12.5 11.5 - 16.0 g/dL    HCT 41.1 35.0 - 47.0 %    MCV 85.3 80.0 - 99.0 FL    MCH 25.9 (L) 26.0 - 34.0 PG    MCHC 30.4 30.0 - 36.5 g/dL    RDW 12.9 11.5 - 14.5 %    PLATELET 808 633 - 847 K/uL    MPV 12.9 8.9 - 12.9 FL    NRBC 0.0 0  WBC    ABSOLUTE NRBC 0.00 0.00 - 0.01 K/uL    NEUTROPHILS 70 32 - 75 %    LYMPHOCYTES 22 12 - 49 %    MONOCYTES 4 (L) 5 - 13 %    EOSINOPHILS 3 0 - 7 %    BASOPHILS 1 0 - 1 %    IMMATURE GRANULOCYTES 0 0.0 - 0.5 %    ABS. NEUTROPHILS 7.1 1.8 - 8.0 K/UL    ABS. LYMPHOCYTES 2.2 0.8 - 3.5 K/UL    ABS. MONOCYTES 0.4 0.0 - 1.0 K/UL    ABS. EOSINOPHILS 0.3 0.0 - 0.4 K/UL    ABS. BASOPHILS 0.1 0.0 - 0.1 K/UL    ABS. IMM. GRANS. 0.0 0.00 - 0.04 K/UL    DF AUTOMATED     HEPATITIS C AB   Result Value Ref Range    Hep C virus Ab Interp. NONREACTIVE NONREACTIVE         No results found for this visit on 06/27/22. Assessment/ Plan:     1. Chronic left shoulder pain/Scapular pain   Orders Placed This Encounter    REFERRAL TO PHYSICAL THERAPY     Referral Priority:   Routine     Referral Type:   PT/OT/ST     Referral Reason:   Specialty Services Required     Referral Location:   [facility]     Referred to Provider:   Rachel Gutierres PT     Requested Specialty:   Physical Therapy       2. Anxiety  Lexapro 20 mg po daily  effective. Side effect weight gain. We discussed considering another SSRI . Unable to take Zoloft     3. Nausea  Zofran refilled       No orders of the defined types were placed in this encounter. Verbal and written instructions (see AVS) provided. Patient expresses understanding of diagnosis and treatment plan.     Health Maintenance Due   Topic Date Due    DTaP/Tdap/Td series (1 - Tdap) Never done               JEAN Lowry

## 2022-06-29 NOTE — ACP (ADVANCE CARE PLANNING)
Discussed importance of advanced medical directives with patient. Patient is capable of making decisions.   Finesse Perez NP-C

## 2022-07-03 RX ORDER — ONDANSETRON 4 MG/1
4 TABLET, ORALLY DISINTEGRATING ORAL
Qty: 20 TABLET | Refills: 2 | Status: SHIPPED | OUTPATIENT
Start: 2022-07-03 | End: 2022-10-26 | Stop reason: SDUPTHER

## 2022-08-15 RX ORDER — ESCITALOPRAM OXALATE 20 MG/1
TABLET ORAL
Qty: 30 TABLET | Refills: 5 | Status: SHIPPED | OUTPATIENT
Start: 2022-08-15

## 2022-08-29 ENCOUNTER — OFFICE VISIT (OUTPATIENT)
Dept: FAMILY MEDICINE CLINIC | Age: 32
End: 2022-08-29
Payer: COMMERCIAL

## 2022-08-29 VITALS
WEIGHT: 289 LBS | HEART RATE: 85 BPM | HEIGHT: 62 IN | DIASTOLIC BLOOD PRESSURE: 90 MMHG | RESPIRATION RATE: 16 BRPM | SYSTOLIC BLOOD PRESSURE: 122 MMHG | BODY MASS INDEX: 53.18 KG/M2 | OXYGEN SATURATION: 98 % | TEMPERATURE: 97.7 F

## 2022-08-29 DIAGNOSIS — F41.9 ANXIETY: ICD-10-CM

## 2022-08-29 DIAGNOSIS — M25.512 CHRONIC LEFT SHOULDER PAIN: Primary | ICD-10-CM

## 2022-08-29 DIAGNOSIS — Z88.9 HISTORY OF SEASONAL ALLERGIES: ICD-10-CM

## 2022-08-29 DIAGNOSIS — G89.29 CHRONIC LEFT SHOULDER PAIN: Primary | ICD-10-CM

## 2022-08-29 PROCEDURE — 99214 OFFICE O/P EST MOD 30 MIN: CPT | Performed by: NURSE PRACTITIONER

## 2022-08-29 RX ORDER — METFORMIN HYDROCHLORIDE 500 MG/1
TABLET, EXTENDED RELEASE ORAL
COMMUNITY

## 2022-08-29 NOTE — PROGRESS NOTES
1. \"Have you been to the ER, urgent care clinic since your last visit? Hospitalized since your last visit? \" No    2. \"Have you seen or consulted any other health care providers outside of the [address] [place] [address] since your last visit? \"  Yes [facility] in [facility], and [facility] in [place]    3. For patients aged 39-70: Has the patient had a colonoscopy / FIT/ Cologuard? NA - based on age      If the patient is female:    4. For patients aged 41-77: Has the patient had a mammogram within the past 2 years? NA - based on age or sex      11. For patients aged 21-65: Has the patient had a pap smear?  Yes - no Care Gap present

## 2022-08-30 NOTE — ACP (ADVANCE CARE PLANNING)
Discussed importance of advanced medical directives with patient. Patient is capable of making decisions.  Yolis Sotelo NP-C

## 2022-08-30 NOTE — PROGRESS NOTES
Subjective:     Danielle Brown is a 28 y.o. female who presents today with the following:  Chief Complaint   Patient presents with    Anxiety    Allergies       Patient Active Problem List   Diagnosis Code    Gastroesophageal reflux disease without esophagitis K21.9    Anxiety F41.9    History of seasonal allergies Z88.9         COMPLIANT WITH MEDICATION:   Denies chest pain, dyspnea, palpitations, headache and blurred vision. Blood pressure normotensive. Anxiety; doing well on Lexapro . She endorses one panic attack that lasted 2 to 3 hours when going to bed in the last  months. We discussed her intolerance to short term anti anxiety medications . Buspar made the anxiety worse, atarax caused tachycardia. She endorses her father takes xanax-we discussed at length the concerns relating to benzodiazepines . Encouraged her to consider a therapist, relaxation and mindfulness. Adjusting Lexapro if needed. Lorazepam as a last resort if panic attacks start to occur more frequently consider counseling./therapy. Allergies; seasonal and environmental.  She plans to check with her insurance for an in network allergist.    depression screening addressed not at risk today.     abuse screening addressed denies    learning assessment addressed reviewed nurses notes    fall risk addressed not at risk    HM: addressed declines immunization today    ROS:  Gen: denies fever, chills, fatigue, weight loss, weight gain  HEENT:denies blurry vision, nasal congestion, sore throat  Resp: denies dypsnea, cough, wheezing  CV: denies chest pain radiating to the jaws or arms, palpitations, lower extremity edema  Abd: denies nausea, vomiting, diarrhea, constipation  Neuro: denies numbness/tingling  Endo: denies polyuria, polydipsia, heat/cold intolerance  Heme: no lymphadenopathy    Allergies   Allergen Reactions    Milk Nausea Only    Tree Nuts Other (comments)     Per allergy testing         Current Outpatient Medications:     metFORMIN ER (GLUCOPHAGE XR) 500 mg tablet, metformin  mg tablet,extended release 24 hr  start with one tablet after dinner x 1 week, at that time if tolerating add second dose after breakfast, third week add third tablet, fourth week add fourth tablet. (2 tablets po BID), Disp: , Rfl:     PNV no.153/FA/om3/dha/epa/fish (PRENATAL GUMMIES PO), Take  by mouth., Disp: , Rfl:     escitalopram oxalate (LEXAPRO) 20 mg tablet, TAKE 1 TABLET BY MOUTH EVERY DAY, Disp: 30 Tablet, Rfl: 5    ondansetron (ZOFRAN ODT) 4 mg disintegrating tablet, Take 1 Tablet by mouth every eight (8) hours as needed for Nausea or Vomiting., Disp: 20 Tablet, Rfl: 2    Cetirizine (ZyrTEC) 10 mg cap, 1 Tablet daily. , Disp: , Rfl:     cholecalciferol, vitamin D3, (VITAMIN D3 PO), daily. , Disp: , Rfl:     OMEPRAZOLE PO, 1 Tablet daily. , Disp: , Rfl:     Past Medical History:   Diagnosis Date    Anxiety     GERD (gastroesophageal reflux disease)     History of seasonal allergies        Past Surgical History:   Procedure Laterality Date    HX WISDOM TEETH EXTRACTION         Social History     Tobacco Use   Smoking Status Never   Smokeless Tobacco Never       Social History     Socioeconomic History    Marital status:    Tobacco Use    Smoking status: Never    Smokeless tobacco: Never   Vaping Use    Vaping Use: Never used   Substance and Sexual Activity    Alcohol use: Yes     Comment: Social    Drug use: Never    Sexual activity: Never     Social Determinants of Health     Financial Resource Strain: Low Risk     Difficulty of Paying Living Expenses: Not hard at all   Food Insecurity: No Food Insecurity    Worried About Running Out of Food in the Last Year: Never true    Ran Out of Food in the Last Year: Never true   Transportation Needs: No Transportation Needs    Lack of Transportation (Medical): No    Lack of Transportation (Non-Medical): No       Family History   Problem Relation Age of Onset    Gall Bladder Disease Mother     Anxiety Father Depression Father          Objective:     Visit Vitals  BP (!) 122/90 (BP 1 Location: Right upper arm, BP Patient Position: Sitting, BP Cuff Size: Large adult)   Pulse 85   Temp 97.7 °F (36.5 °C) (Temporal)   Resp 16   Ht 5' 2\" (1.575 m)   Wt 289 lb (131.1 kg)   LMP 11/15/2021 (Approximate)   SpO2 98%   BMI 52.86 kg/m²     Body mass index is 52.86 kg/m². General: Alert and oriented. No acute distress. Well nourished  HEENT :  Ears:TMs are normal. Canals are clear. Eyes: pupils equal, round, react to light and accommodation. Extra ocular movements intact. Nose: patent. Mouth and throat is clear. Neck:supple full range of motion no thyromegaly. Trachea midline, No carotid bruits. No significant lymphadenopathy  Lungs[de-identified] clear to auscultation without wheezes, rales, or rhonchi. Heart :RRR, S1 & S2 are normal intensity. No murmur; no gallop  Abdomen: bowel sounds active. No tenderness, guarding, rebound, masses, hepatic or spleen enlargement  Back: no CVA tenderness. Extremities: without clubbing, cyanosis, or edema  Pulses: radial and femoral pulses are normal  Neuro: HMF intact. Cranial nerves II through XII grossly normal.  Motor: is 5 over 5 and symmetrical.   Deep tendon reflexes: +2 equal  Psych:appropriate behavior, mood, affect and judgement.    Results for orders placed or performed in visit on 02/08/22   TSH 3RD GENERATION   Result Value Ref Range    TSH 0.95 0.36 - 6.09 uIU/mL   METABOLIC PANEL, COMPREHENSIVE   Result Value Ref Range    Sodium 137 136 - 145 mmol/L    Potassium 4.1 3.5 - 5.1 mmol/L    Chloride 107 97 - 108 mmol/L    CO2 24 21 - 32 mmol/L    Anion gap 6 5 - 15 mmol/L    Glucose 100 65 - 100 mg/dL    BUN 9 6 - 20 MG/DL    Creatinine 0.76 0.55 - 1.02 MG/DL    BUN/Creatinine ratio 12 12 - 20      GFR est AA >60 >60 ml/min/1.73m2    GFR est non-AA >60 >60 ml/min/1.73m2    Calcium 9.4 8.5 - 10.1 MG/DL    Bilirubin, total <0.1 (L) 0.2 - 1.0 MG/DL    ALT (SGPT) 92 (H) 12 - 78 U/L    AST (SGOT) 30 15 - 37 U/L    Alk. phosphatase 62 45 - 117 U/L    Protein, total 7.4 6.4 - 8.2 g/dL    Albumin 3.5 3.5 - 5.0 g/dL    Globulin 3.9 2.0 - 4.0 g/dL    A-G Ratio 0.9 (L) 1.1 - 2.2     HEMOGLOBIN A1C WITH EAG   Result Value Ref Range    Hemoglobin A1c 5.5 4.0 - 5.6 %    Est. average glucose 111 mg/dL   LIPID PANEL   Result Value Ref Range    Cholesterol, total 156 <200 MG/DL    Triglyceride 113 <150 MG/DL    HDL Cholesterol 46 MG/DL    LDL, calculated 87.4 0 - 100 MG/DL    VLDL, calculated 22.6 MG/DL    CHOL/HDL Ratio 3.4 0.0 - 5.0     CBC WITH AUTOMATED DIFF   Result Value Ref Range    WBC 10.1 3.6 - 11.0 K/uL    RBC 4.82 3.80 - 5.20 M/uL    HGB 12.5 11.5 - 16.0 g/dL    HCT 41.1 35.0 - 47.0 %    MCV 85.3 80.0 - 99.0 FL    MCH 25.9 (L) 26.0 - 34.0 PG    MCHC 30.4 30.0 - 36.5 g/dL    RDW 12.9 11.5 - 14.5 %    PLATELET 495 801 - 725 K/uL    MPV 12.9 8.9 - 12.9 FL    NRBC 0.0 0  WBC    ABSOLUTE NRBC 0.00 0.00 - 0.01 K/uL    NEUTROPHILS 70 32 - 75 %    LYMPHOCYTES 22 12 - 49 %    MONOCYTES 4 (L) 5 - 13 %    EOSINOPHILS 3 0 - 7 %    BASOPHILS 1 0 - 1 %    IMMATURE GRANULOCYTES 0 0.0 - 0.5 %    ABS. NEUTROPHILS 7.1 1.8 - 8.0 K/UL    ABS. LYMPHOCYTES 2.2 0.8 - 3.5 K/UL    ABS. MONOCYTES 0.4 0.0 - 1.0 K/UL    ABS. EOSINOPHILS 0.3 0.0 - 0.4 K/UL    ABS. BASOPHILS 0.1 0.0 - 0.1 K/UL    ABS. IMM. GRANS. 0.0 0.00 - 0.04 K/UL    DF AUTOMATED     HEPATITIS C AB   Result Value Ref Range    Hep C virus Ab Interp. NONREACTIVE NONREACTIVE         No results found for this visit on 08/29/22. Assessment/ Plan:   1. Chronic left shoulder pain  PT referral encouraged. She endorses she will call with PT provider    2. Anxiety  Continue lexapro as prescribed above. Encouraged her to consider a therapist, relaxation and mindfulness. Adjusting Lexapro if needed. Lorazepam as a last resort if panic attacks start to occur more frequently consider counseling./therapy. 3. History of seasonal allergies  Continue zyrtec .  She endorses she will call with allergest for referral     Orders Placed This Encounter    metFORMIN ER (GLUCOPHAGE XR) 500 mg tablet     Sig: metformin  mg tablet,extended release 24 hr   start with one tablet after dinner x 1 week, at that time if tolerating add second dose after breakfast, third week add third tablet, fourth week add fourth tablet. (2 tablets po BID)    PNV no.153/FA/om3/dha/epa/fish (PRENATAL GUMMIES PO)     Sig: Take  by mouth. Verbal and written instructions (see AVS) provided. Patient expresses understanding of diagnosis and treatment plan.     Health Maintenance Due   Topic Date Due    DTaP/Tdap/Td series (1 - Tdap) Never done               JEAN Arias

## 2022-10-26 ENCOUNTER — VIRTUAL VISIT (OUTPATIENT)
Dept: FAMILY MEDICINE CLINIC | Age: 32
End: 2022-10-26
Payer: COMMERCIAL

## 2022-10-26 DIAGNOSIS — K52.1 DIARRHEA DUE TO DRUG: ICD-10-CM

## 2022-10-26 DIAGNOSIS — R11.0 NAUSEA: ICD-10-CM

## 2022-10-26 DIAGNOSIS — T50.905A MEDICATION SIDE EFFECT, INITIAL ENCOUNTER: Primary | ICD-10-CM

## 2022-10-26 PROCEDURE — 99442 PR PHYS/QHP TELEPHONE EVALUATION 11-20 MIN: CPT | Performed by: NURSE PRACTITIONER

## 2022-10-26 RX ORDER — ONDANSETRON 4 MG/1
4 TABLET, ORALLY DISINTEGRATING ORAL
Qty: 20 TABLET | Refills: 2 | Status: SHIPPED | OUTPATIENT
Start: 2022-10-26

## 2022-10-26 NOTE — ACP (ADVANCE CARE PLANNING)
Discussed importance of advanced medical directives with patient. Patient is capable of making decisions. Oscar De Leon NP-C

## 2022-10-26 NOTE — PROGRESS NOTES
Yassine Alcazar is a 28 y.o. female, evaluated via audio-only technology on 10/26/2022 for Abdominal Pain (Has a BM after each meal started when she startred metformin)  . Patient endorses she is taking the highest dose of metformin XR for PCOS with the infertility specialist. Ms. Judit Colon  endorses diarrhea with every meal and nausea. She is requesting a refill for zofran and discussing her symptoms with the infertility specialist at her appointment on Friday. Assessment & Plan:   Diagnoses and all orders for this visit:    1. Medication side effect, initial encounter    2. Diarrhea due to drug    3. Nausea    Other orders  -     ondansetron (ZOFRAN ODT) 4 mg disintegrating tablet; Take 1 Tablet by mouth every eight (8) hours as needed for Nausea or Vomiting. The complexity of medical decision making for this visit is moderate       12  Subjective:       Prior to Admission medications    Medication Sig Start Date End Date Taking? Authorizing Provider   ondansetron (ZOFRAN ODT) 4 mg disintegrating tablet Take 1 Tablet by mouth every eight (8) hours as needed for Nausea or Vomiting. 10/26/22  Yes Vijaya Hanson NP   metFORMIN ER (GLUCOPHAGE XR) 500 mg tablet metformin  mg tablet,extended release 24 hr   start with one tablet after dinner x 1 week, at that time if tolerating add second dose after breakfast, third week add third tablet, fourth week add fourth tablet. (2 tablets po BID)   Yes Provider, Historical   PNV no.153/FA/om3/dha/epa/fish (PRENATAL GUMMIES PO) Take  by mouth. Yes Provider, Historical   escitalopram oxalate (LEXAPRO) 20 mg tablet TAKE 1 TABLET BY MOUTH EVERY DAY 8/15/22  Yes Vijaya Hanson NP   Cetirizine (ZyrTEC) 10 mg cap 1 Tablet daily. Yes Provider, Historical   cholecalciferol, vitamin D3, (VITAMIN D3 PO) daily. Yes Provider, Historical   OMEPRAZOLE PO 1 Tablet daily.    Yes Provider, Historical   ondansetron (ZOFRAN ODT) 4 mg disintegrating tablet Take 1 Tablet by mouth every eight (8) hours as needed for Nausea or Vomiting. 7/3/22 10/26/22  Marcie Rucker NP     Patient Active Problem List   Diagnosis Code    Gastroesophageal reflux disease without esophagitis K21.9    Anxiety F41.9    History of seasonal allergies Z88.9     Current Outpatient Medications   Medication Sig Dispense Refill    ondansetron (ZOFRAN ODT) 4 mg disintegrating tablet Take 1 Tablet by mouth every eight (8) hours as needed for Nausea or Vomiting. 20 Tablet 2    metFORMIN ER (GLUCOPHAGE XR) 500 mg tablet metformin  mg tablet,extended release 24 hr   start with one tablet after dinner x 1 week, at that time if tolerating add second dose after breakfast, third week add third tablet, fourth week add fourth tablet. (2 tablets po BID)      PNV no.153/FA/om3/dha/epa/fish (PRENATAL GUMMIES PO) Take  by mouth.      escitalopram oxalate (LEXAPRO) 20 mg tablet TAKE 1 TABLET BY MOUTH EVERY DAY 30 Tablet 5    Cetirizine (ZyrTEC) 10 mg cap 1 Tablet daily. cholecalciferol, vitamin D3, (VITAMIN D3 PO) daily. OMEPRAZOLE PO 1 Tablet daily. Allergies   Allergen Reactions    Milk Nausea Only    Tree Nuts Other (comments)     Per allergy testing     Past Medical History:   Diagnosis Date    Anxiety     GERD (gastroesophageal reflux disease)     History of seasonal allergies      Past Surgical History:   Procedure Laterality Date    HX WISDOM TEETH EXTRACTION       Family History   Problem Relation Age of Onset    Gall Bladder Disease Mother     Anxiety Father     Depression Father      Social History     Tobacco Use    Smoking status: Never    Smokeless tobacco: Never   Substance Use Topics    Alcohol use: Yes     Comment: Social       ROS    Patient-Reported Temperature: denies fever  Patient-Reported LMP: 10-       Graciela Bermudez was evaluated through a patient-initiated, synchronous (real-time) audio only encounter.  She (or guardian if applicable) is aware that it is a billable service, which includes applicable co-pays, with coverage as determined by her insurance carrier. This visit was conducted with the patient's (and/or Darroll Face guardian's) verbal consent. She has not had a related appointment within my department in the past 7 days or scheduled within the next 24 hours. The patient was located in a state where the provider was licensed to provide care. The patient was located at: Home: [address] [place] [address]  The provider was located at:  Facility (Appt Department): [id number] [phone]    Total Time: minutes: 11-20 minutes    Halima Little NP

## 2022-10-26 NOTE — PROGRESS NOTES
Chief Complaint   Patient presents with    Abdominal Pain     Has a BM after each meal started when she startred metformin     1. \"Have you been to the ER, urgent care clinic since your last visit? Hospitalized since your last visit? \" No    2. \"Have you seen or consulted any other health care providers outside of the [address] [place] [address] since your last visit? \" No     3. For patients aged 39-70: Has the patient had a colonoscopy / FIT/ Cologuard? NA - based on age      If the patient is female:    4. For patients aged 41-77: Has the patient had a mammogram within the past 2 years? NA - based on age or sex      11. For patients aged 21-65: Has the patient had a pap smear?  Yes - no Care Gap present

## 2023-01-17 RX ORDER — ONDANSETRON 4 MG/1
4 TABLET, ORALLY DISINTEGRATING ORAL
Qty: 20 TABLET | Refills: 2 | Status: SHIPPED | OUTPATIENT
Start: 2023-01-17

## 2023-02-10 RX ORDER — ESCITALOPRAM OXALATE 20 MG/1
20 TABLET ORAL DAILY
Qty: 30 TABLET | Refills: 5 | Status: SHIPPED | OUTPATIENT
Start: 2023-02-10

## 2023-02-10 NOTE — TELEPHONE ENCOUNTER
Pt is requesting a refill on Lexapro 20 mg. She uses [facility]. She is out completely and would like one of the providers to possibly refill since Lilyan Cary is not here today.

## 2023-02-27 ENCOUNTER — VIRTUAL VISIT (OUTPATIENT)
Dept: FAMILY MEDICINE CLINIC | Age: 33
End: 2023-02-27
Payer: COMMERCIAL

## 2023-02-27 DIAGNOSIS — Z76.89 ENCOUNTER FOR WEIGHT MANAGEMENT: ICD-10-CM

## 2023-02-27 DIAGNOSIS — E28.2 PCOS (POLYCYSTIC OVARIAN SYNDROME): Primary | ICD-10-CM

## 2023-02-27 PROCEDURE — 99443 PR PHYS/QHP TELEPHONE EVALUATION 21-30 MIN: CPT | Performed by: NURSE PRACTITIONER

## 2023-02-27 RX ORDER — ONDANSETRON 4 MG/1
4 TABLET, ORALLY DISINTEGRATING ORAL
Qty: 30 TABLET | Refills: 2 | Status: SHIPPED | OUTPATIENT
Start: 2023-02-27 | End: 2023-03-29

## 2023-02-27 NOTE — PROGRESS NOTES
Beverly Dunlap is a 33 y.o. female, evaluated via audio-only technology on 2/27/2023 for PCOS and Weight Management  . She endorses taking metformin for PCOS and we discussed weight management with RMEEXK pro versus Cons. Assessment & Plan:   Diagnoses and all orders for this visit:    1. PCOS (polycystic ovarian syndrome)    2. Encounter for weight management  She is considering wegovy Discussed the patient's BMI with her. The BMI follow up plan is as follows:     dietary management education, guidance, and counseling  encourage exercise  monitor weight  prescribed dietary intake    An After Visit Summary attached. Other orders  -     ondansetron (ZOFRAN ODT) 4 mg disintegrating tablet; Take 1 Tablet by mouth every eight (8) hours as needed for Nausea or Vomiting for up to 30 days. The complexity of medical decision making for this visit is moderate       12  Subjective:       Prior to Admission medications    Medication Sig Start Date End Date Taking? Authorizing Provider   ondansetron (ZOFRAN ODT) 4 mg disintegrating tablet Take 1 Tablet by mouth every eight (8) hours as needed for Nausea or Vomiting for up to 30 days. 2/27/23 3/29/23 Yes Bianca Mejia, MANUEL   escitalopram oxalate (LEXAPRO) 20 mg tablet Take 1 Tablet by mouth daily. 2/10/23  Yes Michael Singh NP   metFORMIN ER (GLUCOPHAGE XR) 500 mg tablet metformin  mg tablet,extended release 24 hr   start with one tablet after dinner x 1 week, at that time if tolerating add second dose after breakfast, third week add third tablet, fourth week add fourth tablet. (2 tablets po BID)   Yes Provider, Historical   PNV no.153/FA/om3/dha/epa/fish (PRENATAL GUMMIES PO) Take  by mouth. Yes Provider, Historical   Cetirizine (ZyrTEC) 10 mg cap 1 Tablet daily. Yes Provider, Historical   cholecalciferol, vitamin D3, (VITAMIN D3 PO) daily. Yes Provider, Historical   OMEPRAZOLE PO 1 Tablet daily.    Yes Provider, Historical   ondansetron [facility] ODT) 4 mg disintegrating tablet Take 1 Tablet by mouth every eight (8) hours as needed for Nausea or Vomiting. 1/17/23 2/27/23  Ettie Heimlich, NP     Patient Active Problem List   Diagnosis Code    Gastroesophageal reflux disease without esophagitis K21.9    Anxiety F41.9    History of seasonal allergies Z88.9     Patient Active Problem List    Diagnosis Date Noted    Gastroesophageal reflux disease without esophagitis 01/25/2022    Anxiety 01/25/2022    History of seasonal allergies 01/25/2022     Current Outpatient Medications   Medication Sig Dispense Refill    ondansetron (ZOFRAN ODT) 4 mg disintegrating tablet Take 1 Tablet by mouth every eight (8) hours as needed for Nausea or Vomiting for up to 30 days. 30 Tablet 2    escitalopram oxalate (LEXAPRO) 20 mg tablet Take 1 Tablet by mouth daily. 30 Tablet 5    metFORMIN ER (GLUCOPHAGE XR) 500 mg tablet metformin  mg tablet,extended release 24 hr   start with one tablet after dinner x 1 week, at that time if tolerating add second dose after breakfast, third week add third tablet, fourth week add fourth tablet. (2 tablets po BID)      PNV no.153/FA/om3/dha/epa/fish (PRENATAL GUMMIES PO) Take  by mouth. Cetirizine (ZyrTEC) 10 mg cap 1 Tablet daily. cholecalciferol, vitamin D3, (VITAMIN D3 PO) daily. OMEPRAZOLE PO 1 Tablet daily.        Allergies   Allergen Reactions    Milk Nausea Only    Tree Nuts Other (comments)     Per allergy testing     Past Medical History:   Diagnosis Date    Anxiety     GERD (gastroesophageal reflux disease)     History of seasonal allergies      Past Surgical History:   Procedure Laterality Date    HX WISDOM TEETH EXTRACTION       Family History   Problem Relation Age of Onset    Gall Bladder Disease Mother     Anxiety Father     Depression Father      Social History     Tobacco Use    Smoking status: Never    Smokeless tobacco: Never   Substance Use Topics    Alcohol use: Yes     Comment: Social ROS    Patient-Reported LMP: 2--       Beverly Dunlap was evaluated through a patient-initiated, synchronous (real-time) audio only encounter. She (or guardian if applicable) is aware that it is a billable service, which includes applicable co-pays, with coverage as determined by her insurance carrier. This visit was conducted with the patient's (and/or Alejo Stoddard guardian's) verbal consent. She has not had a related appointment within my department in the past 7 days or scheduled within the next 24 hours. The patient was located in a state where the provider was licensed to provide care. The patient was located at: Home: [address] [place] [address]  The provider was located at:  Facility (Appt Department): [address] [id number]    Total Time: minutes: 21-30 minutes    Ashu Yoder NP

## 2023-02-27 NOTE — PROGRESS NOTES
Chief Complaint   Patient presents with    PCOS    Weight Management     YES Answers must have Comments  1. \"Have you been to the ER, urgent care clinic since your last visit? Hospitalized since your last visit? \"    [] YES   [x] NO       2. Have you seen or consulted any other health care providers outside of [address] [place] [address] since your last visit?     [] YES    NO       3. For patients aged 39-70: Have you had a colorectal cancer screening such as a colonoscopy/FIT/Cologuard? Nurse/CMA to request records if not in chart   [] YES   [] NO   [x] NA, based on age    If the patient is female:      4. For female patients aged 41-77: Darrel Exon you had a mammogram in the last two years?  Nurse/CMA to request records if not in chart   [] YES   [] NO   [x] NA, based on age    11. For female patients aged 21-65: Darrel Exon you had a pap smear?   Nurse/CMA to request records if not in chart   [x] YES  11-, [facility]  [] NO  [] NA, based on age

## 2023-02-28 NOTE — ACP (ADVANCE CARE PLANNING)
Discussed importance of advanced medical directives with patient. Patient is capable of making decisions.   Marvin Harrington NP-C

## 2023-03-12 ENCOUNTER — HOSPITAL ENCOUNTER (EMERGENCY)
Age: 33
Discharge: HOME | End: 2023-03-12
Attending: EMERGENCY MEDICINE
Payer: COMMERCIAL

## 2023-03-12 ENCOUNTER — APPOINTMENT (OUTPATIENT)
Dept: GENERAL RADIOLOGY | Age: 33
End: 2023-03-12
Attending: EMERGENCY MEDICINE
Payer: COMMERCIAL

## 2023-03-12 VITALS
OXYGEN SATURATION: 98 % | DIASTOLIC BLOOD PRESSURE: 87 MMHG | WEIGHT: 280 LBS | HEIGHT: 64 IN | RESPIRATION RATE: 16 BRPM | BODY MASS INDEX: 47.8 KG/M2 | TEMPERATURE: 98.6 F | SYSTOLIC BLOOD PRESSURE: 130 MMHG | HEART RATE: 62 BPM

## 2023-03-12 DIAGNOSIS — M72.2 PLANTAR FASCIITIS: Primary | ICD-10-CM

## 2023-03-12 PROCEDURE — 73630 X-RAY EXAM OF FOOT: CPT

## 2023-03-12 PROCEDURE — 99283 EMERGENCY DEPT VISIT LOW MDM: CPT

## 2023-03-12 NOTE — ED PROVIDER NOTES
EMERGENCY DEPARTMENT HISTORY AND PHYSICAL EXAM      Date: 3/12/2023  Patient Name: Audelia Dean    History of Presenting Illness     Chief Complaint   Patient presents with    Foot Pain       History Provided By: Patient    HPI: Audelia Dean, 33 y.o. female with past medical history listed below, presents via private vehicle to the ED with cc of left heel pain. Patient reports pain in her left heel is been going on for the past 2 to 3 weeks. She reports pain with weightbearing and ambulation. No known injury other than stepping on a dog toy. .  Pain is in the arch of her foot. No rash or swelling. No treatment prior to arrival.  Pain worse with ambulation and weightbearing. There are no other complaints, changes, or physical findings at this time. PCP: Bonilla Nash NP    No current facility-administered medications on file prior to encounter. Current Outpatient Medications on File Prior to Encounter   Medication Sig Dispense Refill    ondansetron (ZOFRAN ODT) 4 mg disintegrating tablet Take 1 Tablet by mouth every eight (8) hours as needed for Nausea or Vomiting for up to 30 days. 30 Tablet 2    escitalopram oxalate (LEXAPRO) 20 mg tablet Take 1 Tablet by mouth daily. 30 Tablet 5    metFORMIN ER (GLUCOPHAGE XR) 500 mg tablet metformin  mg tablet,extended release 24 hr   start with one tablet after dinner x 1 week, at that time if tolerating add second dose after breakfast, third week add third tablet, fourth week add fourth tablet. (2 tablets po BID)      PNV no.153/FA/om3/dha/epa/fish (PRENATAL GUMMIES PO) Take  by mouth. Cetirizine (ZyrTEC) 10 mg cap 1 Tablet daily. cholecalciferol, vitamin D3, (VITAMIN D3 PO) daily. OMEPRAZOLE PO 1 Tablet daily.          Past History     Past Medical History:  Past Medical History:   Diagnosis Date    Anxiety     GERD (gastroesophageal reflux disease)     History of seasonal allergies        Past Surgical History:  Past Surgical History:   Procedure Laterality Date    HX WISDOM TEETH EXTRACTION         Family History:  Family History   Problem Relation Age of Onset    Gall Bladder Disease Mother     Anxiety Father     Depression Father        Social History:  Social History     Tobacco Use    Smoking status: Never    Smokeless tobacco: Never   Vaping Use    Vaping Use: Never used   Substance Use Topics    Alcohol use: Yes     Comment: Social    Drug use: Never       Allergies: Allergies   Allergen Reactions    Milk Nausea Only    Tree Nuts Other (comments)     Per allergy testing         Review of Systems   Review of Systems   Constitutional:  Negative for activity change, chills and fever. HENT:  Negative for congestion and sore throat. Eyes:  Negative for pain and redness. Respiratory:  Negative for cough, chest tightness and shortness of breath. Cardiovascular:  Negative for chest pain and palpitations. Gastrointestinal:  Negative for abdominal pain, diarrhea, nausea and vomiting. Genitourinary:  Negative for dysuria, frequency and urgency. Musculoskeletal:  Negative for back pain and neck pain. Skin:  Negative for rash. Neurological:  Negative for syncope, light-headedness and headaches. Psychiatric/Behavioral:  Negative for confusion. All other systems reviewed and are negative. Physical Exam     Physical Exam  Constitutional:       General: She is not in acute distress. Appearance: She is well-developed. She is not diaphoretic. HENT:      Head: Normocephalic and atraumatic. Eyes:      General: No scleral icterus. Conjunctiva/sclera: Conjunctivae normal.      Pupils: Pupils are equal, round, and reactive to light. Pulmonary:      Effort: Pulmonary effort is normal.   Musculoskeletal:         General: Normal range of motion. Comments: Mild tenderness to the arch of the foot. No bony tenderness on the dorsal surface. Toes up/down. 2+ DP and PT pulses. Brisk CR. No rash or swelling. Skin intact. Skin:     General: Skin is warm and dry. Findings: No rash. Neurological:      Mental Status: She is alert and oriented to person, place, and time. Psychiatric:         Behavior: Behavior normal.             Diagnostic Study Results     Labs -   No results found for this or any previous visit (from the past 12 hour(s)). Radiologic Studies -   XR FOOT LT MIN 3 V   Final Result   No acute abnormality. CT Results  (Last 48 hours)      None          CXR Results  (Last 48 hours)      None              Medical Decision Making   I am the first provider for this patient. I reviewed the vital signs, available nursing notes, past medical history, past surgical history, family history and social history. Vital Signs-Reviewed the patient's vital signs. Patient Vitals for the past 12 hrs:   Temp Pulse Resp BP SpO2   03/12/23 1401 98.6 °F (37 °C) 62 16 130/87 98 %           Records Reviewed: Nursing notes reviewed    Provider Notes (Medical Decision Making):   DDX: 36-year-old female with arch pain for 2 or 3 weeks in the left foot. Exam consistent with plantar fasciitis. Obtain plain films to rule out stress fracture. Plain films without any acute findings. Suspect plantar fasciitis. Recommended rolling her foot with a tennis ball or lacrosse ball and various plantar fasciitis arch supports. ED Course:   Initial assessment performed. The patients presenting problems have been discussed, and they are in agreement with the care plan formulated and outlined with them. I have encouraged them to ask questions as they arise throughout their visit. Progress note:    Pt noted to be feeling better and ready for discharge. Updated pt and/or family on all final lab and/or  imaging findings. Will follow up as instructed. All questions have been answered, pt voiced understanding and agreement with plan.            Specific return precautions provided as well as instructions to return to the ED should sx worsen at any time. Vital signs stable for discharge. I have also put together some discharge instructions for them that include: 1) educational information regarding their diagnosis, 2) how to care for their diagnosis at home, as well a 3) list of reasons why they would want to return to the ED prior to their follow-up appointment, should their condition change. Written by Jasmin Lepe MD        Critical Care Time:   0    Disposition:  Discharge    PLAN:  1. Current Discharge Medication List        2. Follow-up Information       Follow up With Specialties Details Why Contact Info    Dyane Hashimoto, NP Nurse Practitioner Schedule an appointment as soon as possible for a visit in 1 week  [address]  [facility] [address]      Sarah Escobar MD Orthopedic Surgery Schedule an appointment as soon as possible for a visit in 1 week As needed [id number] [facility]  [phone]            Return to ED if worse     Diagnosis     Clinical Impression:   1. Plantar fasciitis              Please note that this dictation was completed with [employer], the computer voice recognition software. Quite often unanticipated grammatical, syntax, homophones, and other interpretive errors are inadvertently transcribed by the computer software. Please disregard these errors. Please excuse any errors that have escaped final proofreading.

## 2023-07-24 RX ORDER — ESCITALOPRAM OXALATE 20 MG/1
TABLET ORAL
Qty: 30 TABLET | Refills: 2 | Status: SHIPPED | OUTPATIENT
Start: 2023-07-24

## 2023-08-29 RX ORDER — ESCITALOPRAM OXALATE 20 MG/1
TABLET ORAL
Qty: 30 TABLET | Refills: 2 | Status: SHIPPED | OUTPATIENT
Start: 2023-08-29

## 2023-10-16 RX ORDER — ESCITALOPRAM OXALATE 20 MG/1
TABLET ORAL
Qty: 90 TABLET | OUTPATIENT
Start: 2023-10-16

## 2023-11-16 RX ORDER — ONDANSETRON 4 MG/1
TABLET, ORALLY DISINTEGRATING ORAL
Qty: 30 TABLET | Refills: 1 | OUTPATIENT
Start: 2023-11-16